# Patient Record
Sex: MALE | Race: WHITE | NOT HISPANIC OR LATINO | Employment: FULL TIME | RURAL
[De-identification: names, ages, dates, MRNs, and addresses within clinical notes are randomized per-mention and may not be internally consistent; named-entity substitution may affect disease eponyms.]

---

## 2023-01-06 ENCOUNTER — OFFICE VISIT (OUTPATIENT)
Dept: FAMILY MEDICINE CLINIC | Facility: CLINIC | Age: 33
End: 2023-01-06
Payer: COMMERCIAL

## 2023-01-06 ENCOUNTER — TELEPHONE (OUTPATIENT)
Dept: FAMILY MEDICINE CLINIC | Facility: CLINIC | Age: 33
End: 2023-01-06

## 2023-01-06 VITALS
BODY MASS INDEX: 31.64 KG/M2 | RESPIRATION RATE: 16 BRPM | SYSTOLIC BLOOD PRESSURE: 132 MMHG | DIASTOLIC BLOOD PRESSURE: 80 MMHG | HEIGHT: 72 IN | HEART RATE: 62 BPM | OXYGEN SATURATION: 99 % | TEMPERATURE: 98.4 F | WEIGHT: 233.6 LBS

## 2023-01-06 DIAGNOSIS — M25.562 ACUTE PAIN OF LEFT KNEE: Primary | ICD-10-CM

## 2023-01-06 PROCEDURE — 99213 OFFICE O/P EST LOW 20 MIN: CPT | Performed by: NURSE PRACTITIONER

## 2023-01-06 RX ORDER — PREDNISONE 10 MG/1
TABLET ORAL
Qty: 21 TABLET | Refills: 0 | Status: SHIPPED | OUTPATIENT
Start: 2023-01-06 | End: 2023-01-27

## 2023-01-06 NOTE — TELEPHONE ENCOUNTER
Caller: [facility] [address] [place] [address] MONY [facility] - [phone]  - [phone]    Relationship: Pharmacy    Best call back number: [phone]    What is the best time to reach you: ANY    Who are you requesting to speak with (clinical staff, provider,  specific staff member): CLINICAL    What was the call regarding: THE PHARMACY NEEDS A CALL BACK REGARDING DOSAGE AND FILL VERIFICATION.      Do you require a callback: YES, PLEASE.

## 2023-01-07 NOTE — ASSESSMENT & PLAN NOTE
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 He felt a shooting pain in his left knee at the time. It has continued into today. He described the feeling as \"a charley horse\", that is positional in nature. He notes it is worse after prolonged sitting when he tried to stand again. He denies any shooting, burning or tingling, no numbness. When he points out the location it is located more lateral posterior area of the knee. Physical exam negative for any signs of ACL or MCL injury    -CT imaging to rule out structural damage  -Rest for the next five days, keep leg elevated, utilize cool compress  -Tapering prednisone as written.

## 2023-01-13 ENCOUNTER — TELEPHONE (OUTPATIENT)
Dept: FAMILY MEDICINE CLINIC | Facility: CLINIC | Age: 33
End: 2023-01-13
Payer: COMMERCIAL

## 2023-01-13 DIAGNOSIS — M25.562 ACUTE PAIN OF LEFT KNEE: Primary | ICD-10-CM

## 2023-01-13 DIAGNOSIS — M25.562 ACUTE PAIN OF LEFT KNEE: ICD-10-CM

## 2023-01-17 ENCOUNTER — TELEPHONE (OUTPATIENT)
Dept: FAMILY MEDICINE CLINIC | Facility: CLINIC | Age: 33
End: 2023-01-17
Payer: COMMERCIAL

## 2023-01-17 ENCOUNTER — TELEPHONE (OUTPATIENT)
Dept: FAMILY MEDICINE CLINIC | Facility: CLINIC | Age: 33
End: 2023-01-17

## 2023-01-17 DIAGNOSIS — M25.562 ACUTE PAIN OF LEFT KNEE: Primary | ICD-10-CM

## 2023-01-17 NOTE — TELEPHONE ENCOUNTER
Called and spoke with patient and advised him of CT results and he verbalized understanding. He states that his knee hurts in the morning and when he stands for long periods of time.I advised him if no better to call and make an appointment for evaluation

## 2023-01-17 NOTE — TELEPHONE ENCOUNTER
----- Message from ROSETTA Medrano sent at 1/17/2023  8:31 AM EST -----  Please let bandar know I reviewed his CT. Looks like there is no significant injury to his knee, just some bursitis of his knee cap which is inflammation of the fluid filled sac in the joint. The treatment for this is the steroids and instructions I gave him during his visit. Ask him if he is noticing improvement

## 2023-01-25 DIAGNOSIS — M25.562 ACUTE PAIN OF LEFT KNEE: ICD-10-CM

## 2023-01-26 ENCOUNTER — TELEPHONE (OUTPATIENT)
Dept: FAMILY MEDICINE CLINIC | Facility: CLINIC | Age: 33
End: 2023-01-26
Payer: COMMERCIAL

## 2023-01-26 NOTE — TELEPHONE ENCOUNTER
----- Message from ROSETTA Medrano sent at 1/25/2023  7:39 PM EST -----  Please let Hammad know I have reviewed his MRI and it was normal. No damage noted

## 2023-01-27 ENCOUNTER — OFFICE VISIT (OUTPATIENT)
Dept: FAMILY MEDICINE CLINIC | Facility: CLINIC | Age: 33
End: 2023-01-27
Payer: COMMERCIAL

## 2023-01-27 VITALS
WEIGHT: 236.2 LBS | BODY MASS INDEX: 31.99 KG/M2 | HEART RATE: 85 BPM | DIASTOLIC BLOOD PRESSURE: 74 MMHG | RESPIRATION RATE: 18 BRPM | TEMPERATURE: 97.5 F | OXYGEN SATURATION: 98 % | HEIGHT: 72 IN | SYSTOLIC BLOOD PRESSURE: 126 MMHG

## 2023-01-27 DIAGNOSIS — M54.16 LEFT LUMBAR RADICULOPATHY: Primary | ICD-10-CM

## 2023-01-27 PROCEDURE — 99214 OFFICE O/P EST MOD 30 MIN: CPT | Performed by: NURSE PRACTITIONER

## 2023-01-27 NOTE — ASSESSMENT & PLAN NOTE
Worsening left lower extremity pain when standing or walking despite treatment with Medrol Dosepak, rest and cold compress therapy.  The pain initially was only reported in his knee, but now radiates up his thigh to mid calf range.  CT positive for infrapatellar bursitis, MRI negative.  He has had similar presentation in the past with left lower extremity pain, absence of back pain, but was ultimately diagnosed with radiculopathy from disc protrusion and subsequently underwent L5-S1 discectomy.  This was in 2014    -Given his lack of response to current therapies and past history of disc issues, would benefit from reevaluation by Dr. Cali with neurosurgery at this time.  Advised to refrain from work until further evaluation with neurosurgery.

## 2023-01-27 NOTE — PROGRESS NOTES
"    Office Note     Name: Hammad Pressley    : 1990     MRN: [id number]     Chief Complaint  right leg pain    Subjective     History of Present Illness:  Hammad Pressley is a 32 y.o. male who presents today for follow-up on left lower extremity pain.  He initially presented for evaluation of a work-related injury.  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  He described the feeling as \"charley horse horse\", that was positional in nature.  He noted the pain was worse after prolonged sitting and when he tried to stand.  He was giving Medrol Dosepak and instructed to rest for the next 5 days, keeping legs elevated and utilizing cool compresses. Imaging was ordered, CT without contrast as that is what was approved by Workmen's [employer]. CT showed infrapatellar bursitis.  So MRI was pursued.  MRI of the left lower extremity was negative.  He presents today with no relief from prednisone therapy, rest or cold compress, he notes his discomfort is actually worsening.  In review of records he experienced the same presentation in  after a work-related injury.  [place] the lumbar spine at that time revealed left S1 radiculopathy secondary to disc protrusion.  Subsequently he underwent L5-S1 discectomy.  Given negative imaging of left extremity and lack of response to steroid therapy, at this time it would be pertinent for referral to neurosurgery for evaluation.    Review of Systems   Constitutional: Positive for activity change. Negative for fatigue.   Respiratory: Negative for cough, choking, chest tightness, shortness of breath and wheezing.    Cardiovascular: Negative for chest pain, palpitations and leg swelling.   Gastrointestinal: Negative for abdominal pain, constipation, diarrhea, nausea and vomiting.   Genitourinary: Negative for decreased urine volume, difficulty urinating, flank pain and hematuria. " "  Musculoskeletal: Positive for arthralgias and myalgias. Negative for back pain.   Neurological: Negative for weakness, light-headedness, numbness, headache and confusion.       Objective     No past medical history on file.  Past Surgical History:   Procedure Laterality Date   • BACK SURGERY     • SHOULDER SURGERY       No family history on file.    Vital Signs  /74 (BP Location: Left arm, Patient Position: Sitting, Cuff Size: Adult)   Pulse 85   Temp 97.5 °F (36.4 °C) (Temporal)   Resp 18   Ht 182.9 cm (72\")   Wt 107 kg (236 lb 3.2 oz)   SpO2 98%   BMI 32.03 kg/m²   Estimated body mass index is 32.03 kg/m² as calculated from the following:    Height as of this encounter: 182.9 cm (72\").    Weight as of this encounter: 107 kg (236 lb 3.2 oz).    Physical Exam  Vitals reviewed.   Constitutional:       Appearance: Normal appearance.   HENT:      Head: Normocephalic and atraumatic.      Mouth/Throat:      Mouth: Mucous membranes are moist.      Pharynx: Oropharynx is clear.   Eyes:      Conjunctiva/sclera: Conjunctivae normal.   Cardiovascular:      Rate and Rhythm: Normal rate and regular rhythm.      Pulses: Normal pulses.      Heart sounds: Normal heart sounds.   Pulmonary:      Effort: Pulmonary effort is normal.      Breath sounds: Normal breath sounds.   Musculoskeletal:         General: Normal range of motion.      Cervical back: Normal range of motion and neck supple.      Comments: On exam no lumbar tenderness or left lower extremity tenderness noted on palpation.  He reports his discomfort to be only with standing.   Skin:     General: Skin is warm and dry.   Neurological:      Mental Status: He is alert and oriented to person, place, and time.   Psychiatric:         Mood and Affect: Mood normal.         Behavior: Behavior normal.         Thought Content: Thought content normal.         Judgment: Judgment normal.          The following data was reviewed by: ROSETTA Mderano on " 01/27/2023:    Data reviewed: Radiologic studies CT and MRI     POCT Results (if applicable):  No results found for this or any previous visit.         Assessment and Plan     Diagnoses and all orders for this visit:    1. Left lumbar radiculopathy (Primary)  Assessment & Plan:  Worsening left lower extremity pain when standing or walking despite treatment with Medrol Dosepak, rest and cold compress therapy.  The pain initially was only reported in his knee, but now radiates up his thigh to mid calf range.  CT positive for infrapatellar bursitis, MRI negative.  He has had similar presentation in the past with left lower extremity pain, absence of back pain, but was ultimately diagnosed with radiculopathy from disc protrusion and subsequently underwent L5-S1 discectomy.  This was in 2014    -Given his lack of response to current therapies and past history of disc issues, would benefit from reevaluation by Dr. Cali with neurosurgery at this time.  Advised to refrain from work until further evaluation with neurosurgery.                Orders:  -     Ambulatory Referral to Neurosurgery        I spent 35 minutes caring for Hammad on this date of service. This time includes time spent by me in the following activities:preparing for the visit, reviewing tests, performing a medically appropriate examination and/or evaluation , counseling and educating the patient/family/caregiver and referring and communicating with other health care professionals       Follow Up  No follow-ups on file.    Aylin Monique, APRN

## 2023-02-22 ENCOUNTER — OFFICE VISIT (OUTPATIENT)
Dept: NEUROSURGERY | Facility: CLINIC | Age: 33
End: 2023-02-22

## 2023-02-22 VITALS
TEMPERATURE: 98 F | HEIGHT: 72 IN | BODY MASS INDEX: 32.78 KG/M2 | SYSTOLIC BLOOD PRESSURE: 140 MMHG | WEIGHT: 242 LBS | DIASTOLIC BLOOD PRESSURE: 80 MMHG

## 2023-02-22 DIAGNOSIS — M54.16 LEFT LUMBAR RADICULOPATHY: ICD-10-CM

## 2023-02-22 DIAGNOSIS — Z98.890 S/P DISCECTOMY: ICD-10-CM

## 2023-02-22 DIAGNOSIS — M25.562 ACUTE PAIN OF LEFT KNEE: Primary | ICD-10-CM

## 2023-02-22 PROCEDURE — 99203 OFFICE O/P NEW LOW 30 MIN: CPT | Performed by: PHYSICIAN ASSISTANT

## 2023-02-22 RX ORDER — METHOCARBAMOL 500 MG/1
500 TABLET, FILM COATED ORAL 3 TIMES DAILY PRN
Qty: 45 TABLET | Refills: 1 | Status: SHIPPED | OUTPATIENT
Start: 2023-02-22

## 2023-02-22 NOTE — PROGRESS NOTES
"Subjective     Chief Complaint: back pain and left leg pain    Patient ID: Hammad Pressley is a 32 y.o. 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  He has a remote history of L5-S1 discectomy on the left in 2014 with Dr. Cali after developing left leg pain.  He did well postoperatively with no issues.    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  They each pulled away from him in different directions and he twisted to the left.  He had immediate, severe posterior knee pain.  He had a CT and then an MRI of his knee which were negative.  He was also given a steroid pack.  Over the last few weeks, his pain has begun to radiate into his buttock, posterior thigh and back of the knee to at his upper calf on the left.  When it changed character, he felt that it was much more like his previous bout of lumbar radiculopathy and spoke with his primary care who referred him to neurosurgery.  He does not have any imaging of his lumbar spine.    He states that his pain is a sharp burning pain.  It is present worse with standing.  He does have pain with walking longer distances but generally walking is \"not too bad\".  Sitting or lying down gives him good relief of his discomfort.  He denies any groin or testicular pain numbness or tingling.  He has not had any bowel or bladder dysfunction or saddle anesthesia    The following portions of the patient's history were reviewed and updated as appropriate: allergies, current medications, past family history, past medical history, past social history, past surgical history and problem list.    No past medical history on file.     Past Surgical History:   Procedure Laterality Date   • BACK SURGERY  L5-S1 discectomy w Dr. Cali Left  2014   • SHOULDER SURGERY           Family history: No family history on file.    Social history:   Social History     Socioeconomic History   • " "Marital status:    Tobacco Use   • Smoking status: Never   • Smokeless tobacco: Never   Vaping Use   • Vaping Use: Never used   Substance and Sexual Activity   • Alcohol use: Yes     Alcohol/week: 1.0 standard drink     Types: 1 Glasses of wine per week   • Drug use: Never       Review of Systems   Left leg pain    Objective   Blood pressure 140/80, temperature 98 °F (36.7 °C), temperature source Infrared, height 182.9 cm (72.01\"), weight 110 kg (242 lb).  Body mass index is 32.81 kg/m².    Physical Exam    CONSTITUTIONAL:   - Patient is well-nourished  - Pleasant and appears stated age.  PSYCHIATRIC:  - Normal mood and affect  - Behavior is normal.  HENT:   Head: Normocephalic and Atraumatic.   Eyes:     - Pupils are equal, round, and reactive to light.     - EOM are normal.   CV:   - Regular rate and rhythm on palpable radial pulse   PULMONARY:   - Speaking in full sentences, breathing non labored  - No wheezing   SKIN:  - Clean, dry and intact   MUSCULOSKELETAL:  - Neck/Back tenderness to palpation is not observed.   - Strength is 5/5 and intact in the lower extremities to direct testing.  - Muscle tone is normal throughout.  - Station and gait are normal.  - ROM in neck/back is normal.  - Straight leg raise is negative   NEUROLOGICAL:  - A&Ox3  - Attention span, language function, and cognition are intact.  - Sensation is intact to light touch testing throughout.  - Deep tendon reflexes are 2+ and symmetrical.    - Coordination is intact.     Patient's Body mass index is 32.81 kg/m². indicating that he is obese     Assessment & Plan   Mr. Pressley has a new history of left leg pain without significant back pain following a work-related injury on 1/5/2023.  He has had a lumbar discectomy in the past and states that this pain is similar to the pain that he had prior to that surgery.    He has not had any physical therapy or treatment with NSAIDs/muscle relaxants.  We will start with this.  He has done a " steroid pack already.  We will also start him on some physical therapy for the next couple of weeks and keep him off work while he begins that.  If his pain improves, he may be able to return to work as early as March 13.  He will call the week of March 10 with an update.  If his pain is worse or not improving, we will keep him off work and obtain an MRI of the lumbar spine with and without contrast for review at a follow-up visit    Diagnoses and all orders for this visit:    1. Acute pain of left knee (Primary)  -     Ambulatory Referral to Physical Therapy Evaluate and treat    2. Left lumbar radiculopathy  -     Ambulatory Referral to Physical Therapy Evaluate and treat    3. S/P discectomy  -     Ambulatory Referral to Physical Therapy Evaluate and treat    Other orders  -     diclofenac (VOLTAREN) 50 MG EC tablet; Take 1 tablet by mouth Daily.  Dispense: 60 tablet; Refill: 0  -     methocarbamol (Robaxin) 500 MG tablet; Take 1 tablet by mouth 3 (Three) Times a Day As Needed for Muscle Spasms.  Dispense: 45 tablet; Refill: 1        Return in about 15 days (around 3/9/2023).  Olamide Henry PA-C          This document signed by Olamide Henry PA-C  February 22, 2023 11:12 EST

## 2023-03-06 ENCOUNTER — PATIENT MESSAGE (OUTPATIENT)
Dept: NEUROSURGERY | Facility: CLINIC | Age: 33
End: 2023-03-06
Payer: COMMERCIAL

## 2023-03-06 DIAGNOSIS — M54.16 LEFT LUMBAR RADICULOPATHY: Primary | ICD-10-CM

## 2023-03-07 NOTE — TELEPHONE ENCOUNTER
From: Hammad Pressley  To: Olamide Henry  Sent: 3/6/2023 2:06 PM EST  Subject: Leg Pain/Update    Still experiencing pain down leg, especially after moving around and then standing for period of time

## 2023-03-13 ENCOUNTER — TELEPHONE (OUTPATIENT)
Dept: NEUROSURGERY | Facility: CLINIC | Age: 33
End: 2023-03-13
Payer: COMMERCIAL

## 2023-03-13 NOTE — TELEPHONE ENCOUNTER
"Per Jesus:  \" If you all have an inbasket for this patient to schedule an MRI just dismiss, I had a call from the claims officer and she wanted me to fax her the order and she'll schedule it\"  "

## 2023-03-13 NOTE — TELEPHONE ENCOUNTER
Caller: Hammad Pressley    Relationship: Self    Best call back number: [phone]    What is the best time to reach you: ANYTIME    Who are you requesting to speak with (clinical staff, provider,  specific staff member):CLINICAL STAFF    Do you know the name of the person who called:NA    What was the call regarding:PT CALLED TO CHECK STATUS OF MRI ORDER-PT STATES THAT THIS NEEDS TO BE SENT TO HIS WC ADJ. TO HAVE THEM APPROVE AND SCHEDULE HIS MRI-PT IS ALSO ASKING ABOUT A NOTE TO EXTEND TIME OFF DUE TO HIM NOT FEELING ANY BETTER AND STILL CONTINUING WITH PHYSICAL THERAPY PT WOULD LIKE A CALL BACK THANK YOU        Do you require a callback: YES

## 2023-03-15 ENCOUNTER — TELEPHONE (OUTPATIENT)
Dept: NEUROSURGERY | Facility: CLINIC | Age: 33
End: 2023-03-15
Payer: COMMERCIAL

## 2023-03-15 NOTE — TELEPHONE ENCOUNTER
Viraj Patient in 2014.   Last OV: Office Visit with Olamide Henry PA-C (02/22/2023)      Please see [employer] message below. Patient is requesting an updated RTW note. It looks like we are working on scheduling his MRI. Okay to provide work note for a couple more weeks?

## 2023-03-15 NOTE — TELEPHONE ENCOUNTER
----- Message from Brianne Villegas MA sent at 3/15/2023  9:40 AM EDT -----  Regarding: FW: Update  Contact: [phone]  Patient is requesting an updated RTW note. It looks like we are working on scheduling his MRI. Okay to provide work note for a couple more weeks?  ----- Message -----  From: Hammad Pressley  Sent: 3/14/2023   3:21 PM EDT  To: [facility] Clinical Pool  Subject: Update                                           My late note from  had me returning to work yesterday the 13th, since moving forward with a MRI I need an updated note for my work

## 2023-03-20 ENCOUNTER — PATIENT MESSAGE (OUTPATIENT)
Dept: NEUROSURGERY | Facility: CLINIC | Age: 33
End: 2023-03-20
Payer: COMMERCIAL

## 2023-03-20 ENCOUNTER — TELEPHONE (OUTPATIENT)
Dept: NEUROSURGERY | Facility: CLINIC | Age: 33
End: 2023-03-20
Payer: COMMERCIAL

## 2023-03-20 NOTE — TELEPHONE ENCOUNTER
"Patient sent a separate [employer]pp[employer] message stating, \"Since I have a MRI scheduled for 3/31/23 can I go on and schedule my follow up appointment.\"    This is scheduled with [facility] [place], due to Workers Comp.     Please schedule the patient an appointment with a PA after 03/31/2023, and provide an off work note until the follow up appointment.   "

## 2023-03-20 NOTE — TELEPHONE ENCOUNTER
From: Hammad Pressley  To: Olamide Henry  Sent: 3/20/2023 3:01 PM EDT  Subject: MRI    Since I have a MRI scheduled for 3/31/23 can I go on and schedule my follow up appointment

## 2023-03-20 NOTE — TELEPHONE ENCOUNTER
Provider:  Mildred  Surgery/Procedure:  meredith  Surgery/Procedure Date:  na  Last visit:   2-22-23  Next visit: 4-12-23     Reason for call: Michelle called back and said that Workers [employer] would not take the last office note for their file so that the patient can continue with PT.  She has faxed over their PT office note for Mildred hendricks. I have called her and told her that she will not be seeing patients till April.  I will keep the paper and when I see her I will fax this over to Michelle at PT.  She is going to let Workers [employer] know, she was thankful for the call back.                                                                Provider:  Mildred  Surgery/Procedure:  meredith  Surgery/Procedure Date:  na  Last visit:   2-  Next visit: NA     Reason for call: Michelle from PT called and said they needed a new order for workers comp.  I have tried to call her back to get more information, I left a VM to return the call.    Michelle called back and said that she needs a new order for PT for Workmen's Comp to continue to pay for more visits.  I told her that the referral should be good for 6 months to a year and she agreed but  Workmen's  Comp does not.  I am sending over the office visit from Mildred and we are hoping they will accept this.  She will call back if anything else is needed.  She was thankful for the call back.    I have faxed this over this morning.

## 2023-04-12 ENCOUNTER — OFFICE VISIT (OUTPATIENT)
Dept: NEUROSURGERY | Facility: CLINIC | Age: 33
End: 2023-04-12
Payer: OTHER MISCELLANEOUS

## 2023-04-12 VITALS
HEIGHT: 73 IN | WEIGHT: 246 LBS | DIASTOLIC BLOOD PRESSURE: 90 MMHG | SYSTOLIC BLOOD PRESSURE: 120 MMHG | BODY MASS INDEX: 32.6 KG/M2 | TEMPERATURE: 97.3 F

## 2023-04-12 DIAGNOSIS — M51.36 DISC DEGENERATION, LUMBAR: ICD-10-CM

## 2023-04-12 DIAGNOSIS — M54.16 LEFT LUMBAR RADICULOPATHY: Primary | ICD-10-CM

## 2023-04-12 DIAGNOSIS — Z98.890 S/P DISCECTOMY: ICD-10-CM

## 2023-04-12 PROCEDURE — 99214 OFFICE O/P EST MOD 30 MIN: CPT | Performed by: PHYSICIAN ASSISTANT

## 2023-04-12 NOTE — PROGRESS NOTES
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 Cali's clinic for undergoing L5-S1 discectomy on the left in .  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  He had a CT and then an MRI of his knee which were negative.  He was also treated with a steroid pack.  Over a few weeks, the pain began to radiate from his buttock, posterior thigh and back of the knee to his upper calf on the left.  He has been treated with formal therapy.  Today he reports his pain has improved significantly.  Every now and then after standing for a prolonged period he will experience some numbness and discomfort in the lateral left knee.  He has been off work but feels ready to go back      History reviewed. No pertinent past medical history.      Current Outpatient Medications:   •  diclofenac (VOLTAREN) 50 MG EC tablet, Take 1 tablet by mouth Daily., Disp: 60 tablet, Rfl: 0  •  methocarbamol (Robaxin) 500 MG tablet, Take 1 tablet by mouth 3 (Three) Times a Day As Needed for Muscle Spasms., Disp: 45 tablet, Rfl: 1    Past Surgical History:   Procedure Laterality Date   • BACK SURGERY     • SHOULDER SURGERY         Social History     Socioeconomic History   • Marital status:    Tobacco Use   • Smoking status: Never   • Smokeless tobacco: Never   Vaping Use   • Vaping Use: Never used   Substance and Sexual Activity   • Alcohol use: Yes     Alcohol/week: 1.0 standard drink     Types: 1 Cans of beer per week   • Drug use: Never   • Sexual activity: Defer         Review of Systems   Constitutional: Negative for activity change, appetite change, chills, diaphoresis, fatigue, fever and unexpected weight change.   HENT: Negative for congestion, dental problem, drooling,  "ear discharge, ear pain, facial swelling, hearing loss, mouth sores, nosebleeds, postnasal drip, rhinorrhea, sinus pressure, sinus pain, sneezing, sore throat, tinnitus, trouble swallowing and voice change.    Eyes: Negative for photophobia, pain, discharge, redness, itching and visual disturbance.   Respiratory: Negative for apnea, cough, choking, chest tightness, shortness of breath, wheezing and stridor.    Cardiovascular: Negative for chest pain, palpitations and leg swelling.   Gastrointestinal: Negative for abdominal distention, abdominal pain, anal bleeding, blood in stool, constipation, diarrhea, nausea, rectal pain and vomiting.   Endocrine: Negative for cold intolerance, heat intolerance, polydipsia, polyphagia and polyuria.   Genitourinary: Negative for decreased urine volume, difficulty urinating, dysuria, enuresis, flank pain, frequency, genital sores, hematuria and urgency.   Musculoskeletal: Positive for back pain. Negative for arthralgias, gait problem, joint swelling, myalgias, neck pain and neck stiffness.   Skin: Negative for color change, pallor, rash and wound.   Allergic/Immunologic: Negative for environmental allergies, food allergies and immunocompromised state.   Neurological: Negative for dizziness, tremors, seizures, syncope, facial asymmetry, speech difficulty, weakness, light-headedness, numbness and headaches.   Hematological: Negative for adenopathy. Does not bruise/bleed easily.   Psychiatric/Behavioral: Negative for agitation, behavioral problems, confusion, decreased concentration, dysphoric mood, hallucinations, self-injury, sleep disturbance and suicidal ideas. The patient is not nervous/anxious and is not hyperactive.        Objective   Vital Signs: Blood pressure 120/90, temperature 97.3 °F (36.3 °C), temperature source Infrared, height 185.4 cm (73\"), weight 112 kg (246 lb).  Physical Exam  Vitals and nursing note reviewed.   Constitutional:       General: He is not in acute " distress.     Appearance: He is well-developed.   Eyes:      Pupils: Pupils are equal, round, and reactive to light.   Cardiovascular:      Heart sounds: Normal heart sounds.   Pulmonary:      Breath sounds: Normal breath sounds.   Psychiatric:         Behavior: Behavior normal.         Thought Content: Thought content normal.     Musculoskeletal:     Strength is intact in upper and lower extremities to direct testing.     Station and gait are normal.     Straight leg raising is negative.   Neurologic:     Muscle tone is normal throughout.     Sensation is intact to light touch throughout.     Patient is oriented to person, place, and time.         Independent review of radiographic imaging: MRI of the lumbar spine demonstrates focal degenerative disc at L5-S1.  There is postoperative changes from previous discectomy at L5-S1 on the left.  There appears to be at least some recurrent left paracentral disc protrusion as well as scar.    Assessment & Plan   Diagnosis: Recurrent disc herniation with radiculopathy, improved.     Medical Decision Making: Fortunately patient is doing much better. Leg pain is gone. He feels ready to return to work. I am going to send him back for half days for two weeks then he can return to his regular schedule.  I reviewed lifting and body mechanics. Follow up with me in 8 weeks to his progress. Call the office in the interim with any questions or concerns.     Diagnoses and all orders for this visit:    1. Left lumbar radiculopathy (Primary)    2. S/P discectomy    3. Disc degeneration, lumbar                            Rossy Dunne PA-C  Patient Care Team:  Aylin Monique APRN as PCP - General (Family Medicine)

## 2023-05-16 ENCOUNTER — OFFICE VISIT (OUTPATIENT)
Dept: FAMILY MEDICINE CLINIC | Facility: CLINIC | Age: 33
End: 2023-05-16
Payer: OTHER MISCELLANEOUS

## 2023-05-16 VITALS
WEIGHT: 238.4 LBS | TEMPERATURE: 97.2 F | HEIGHT: 73 IN | BODY MASS INDEX: 31.6 KG/M2 | RESPIRATION RATE: 18 BRPM | SYSTOLIC BLOOD PRESSURE: 124 MMHG | OXYGEN SATURATION: 94 % | DIASTOLIC BLOOD PRESSURE: 82 MMHG | HEART RATE: 114 BPM

## 2023-05-16 DIAGNOSIS — M54.16 LEFT LUMBAR RADICULOPATHY: Primary | ICD-10-CM

## 2023-05-16 RX ORDER — KETOROLAC TROMETHAMINE 30 MG/ML
60 INJECTION, SOLUTION INTRAMUSCULAR; INTRAVENOUS ONCE
Status: COMPLETED | OUTPATIENT
Start: 2023-05-16 | End: 2023-05-16

## 2023-05-16 RX ADMIN — KETOROLAC TROMETHAMINE 60 MG: 30 INJECTION, SOLUTION INTRAMUSCULAR; INTRAVENOUS at 15:38

## 2023-05-16 NOTE — ASSESSMENT & PLAN NOTE
Been back to work five weeks. 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 Some radiating through the buttocks. No numbness or tingling. Took some naproxen without benefit.  He showed initial benefit with physical therapy but appears to have suffered a reinjury yesterday at work.  Naproxen, heat and rest are providing no benefit.  -We will contact neurosurgery in hopes of getting him an appointment sooner, will request to be put on cancellation list.  -We will administer 60 mg IM Toradol in office today

## 2023-05-17 ENCOUNTER — TELEPHONE (OUTPATIENT)
Dept: NEUROSURGERY | Facility: CLINIC | Age: 33
End: 2023-05-17
Payer: COMMERCIAL

## 2023-05-17 NOTE — TELEPHONE ENCOUNTER
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     PLEASE CALL PT TO ADVISE/DISCUSS/SCHEDULE    THANK YOU,

## 2023-05-18 NOTE — PROGRESS NOTES
Answers for HPI/ROS submitted by the patient on 2023  What is the primary reason for your visit?: Back Pain  Chronicity: recurrent  Onset: yesterday  Frequency: constantly  Progression since onset: gradually worsening  Pain location: gluteal, lumbar spine, sacro-iliac  Pain quality: aching, shooting  Radiates to: does not radiate  Pain - numeric: 10/10  Pain is: the same all the time  Aggravated by: bending, coughing, position  Stiffness is present: all day  bowel incontinence: No  headaches: No  leg pain: No  paresis: No  paresthesias: No  pelvic pain: No  perianal numbness: No  tingling: No  Risk factors: recent trauma        Office Note     Name: Hammad Pressley    : 1990     MRN: [id number]     Chief Complaint  Back Pain    Subjective     History of Present Illness:  Hammad Pressley is a 32 y.o. male who presents today for ongoing back pain.  He underwent L5-S1 discectomy on the left in .  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  He had a CT and an MRI of his knee which were negative.  Also treated with a steroid pack.  Over the next couple of weeks the pain began to radiate from his buttock, posterior thigh and back of the knee to his upper calf on the left.  He has been treated with physical therapy.  During follow-up at neurosurgery on  he reported his pain had improved significantly.  Now and then after standing for a prolonged period of time he would experience some numbness and discomfort in the lateral left knee.  He had been off work but felt ready to go back.  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  His pain has been unresponsive to over-the-counter NSAIDs.  He has follow-up with neurosurgery in a few weeks but is going to try to get in sooner.  Denies any loss of  "bowel or bladder.  No tenderness on palpation, pain is more positional in nature.  He is finding it hard to stand upright, unable to perform straight leg raise during assessment today.  No further complaints or concerns    Review of Systems   Constitutional: Negative for fever and unexpected weight loss.   Cardiovascular: Negative for chest pain.   Gastrointestinal: Negative for abdominal pain.   Genitourinary: Negative for dysuria and urinary incontinence.   Musculoskeletal: Positive for back pain.   Neurological: Positive for weakness. Negative for numbness.       Objective     No past medical history on file.  Past Surgical History:   Procedure Laterality Date   • BACK SURGERY     • SHOULDER SURGERY       No family history on file.    Vital Signs  /82 (BP Location: Right arm, Patient Position: Standing, Cuff Size: Adult)   Pulse 114   Temp 97.2 °F (36.2 °C) (Temporal)   Resp 18   Ht 185.4 cm (73\")   Wt 108 kg (238 lb 6.4 oz)   SpO2 94%   BMI 31.45 kg/m²   Estimated body mass index is 31.45 kg/m² as calculated from the following:    Height as of this encounter: 185.4 cm (73\").    Weight as of this encounter: 108 kg (238 lb 6.4 oz).    Physical Exam  Constitutional:       Appearance: Normal appearance.   Cardiovascular:      Rate and Rhythm: Normal rate and regular rhythm.      Pulses: Normal pulses.      Heart sounds: Normal heart sounds.   Pulmonary:      Effort: Pulmonary effort is normal.      Breath sounds: Normal breath sounds.   Musculoskeletal:      Cervical back: Normal range of motion and neck supple.      Lumbar back: No tenderness or bony tenderness.   Skin:     General: Skin is warm and dry.   Neurological:      Mental Status: He is alert and oriented to person, place, and time.          Data reviewed: Consultant notes neurosurgery     POCT Results (if applicable):  No results found for this or any previous visit.         Assessment and Plan     Diagnoses and all orders for this " visit:    1. Left lumbar radiculopathy (Primary)  Assessment & Plan:  Been back to work five weeks. 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 Some radiating through the buttocks. No numbness or tingling. Took some naproxen without benefit.  He showed initial benefit with physical therapy but appears to have suffered a reinjury yesterday at work.  Naproxen, heat and rest are providing no benefit.  -We will contact neurosurgery in hopes of getting him an appointment sooner, will request to be put on cancellation list.  -We will administer 60 mg IM Toradol in office today    Orders:  -     ketorolac (TORADOL) injection 60 mg    BMI is >= 30 and <35. (Class 1 Obesity). The following options were offered after discussion;: nutrition counseling/recommendations        Follow Up  No follow-ups on file.    Aylin Monique, APRN

## 2023-05-18 NOTE — TELEPHONE ENCOUNTER
Please refer to MyChart message from Aylin Monique, ROSETTA. Pt has been put in cancellation list for next available appointment.

## 2023-06-05 ENCOUNTER — OFFICE VISIT (OUTPATIENT)
Dept: FAMILY MEDICINE CLINIC | Facility: CLINIC | Age: 33
End: 2023-06-05
Payer: COMMERCIAL

## 2023-06-05 VITALS
DIASTOLIC BLOOD PRESSURE: 74 MMHG | HEART RATE: 74 BPM | WEIGHT: 234.2 LBS | HEIGHT: 73 IN | OXYGEN SATURATION: 98 % | SYSTOLIC BLOOD PRESSURE: 122 MMHG | RESPIRATION RATE: 18 BRPM | TEMPERATURE: 98.2 F | BODY MASS INDEX: 31.04 KG/M2

## 2023-06-05 DIAGNOSIS — L74.510 HYPERHIDROSIS OF AXILLA: ICD-10-CM

## 2023-06-05 DIAGNOSIS — F41.9 ANXIETY: ICD-10-CM

## 2023-06-05 DIAGNOSIS — R17 ELEVATED BILIRUBIN: ICD-10-CM

## 2023-06-05 DIAGNOSIS — D72.829 LEUKOCYTOSIS, UNSPECIFIED TYPE: Primary | ICD-10-CM

## 2023-06-05 RX ORDER — BUPROPION HYDROCHLORIDE 150 MG/1
150 TABLET ORAL DAILY
Qty: 30 TABLET | Refills: 0 | Status: SHIPPED | OUTPATIENT
Start: 2023-06-05 | End: 2023-07-05

## 2023-06-05 RX ORDER — GLYCOPYRROLATE 1 MG/1
1 TABLET ORAL 2 TIMES DAILY
Qty: 60 TABLET | Refills: 0 | Status: SHIPPED | OUTPATIENT
Start: 2023-06-05 | End: 2023-07-05

## 2023-06-05 NOTE — PROGRESS NOTES
Venipuncture Blood Specimen Collection  Venipuncture performed in right arm by Erika Willis MA with good hemostasis. Patient tolerated the procedure well without complications.   06/05/23   Erika Willis MA

## 2023-06-05 NOTE — PROGRESS NOTES
Office Note     Name: Hammad Pressley    : 1990     MRN: [id number]     Chief Complaint  Follow-up (From ER)    Subjective     History of Present Illness:  Hammad Pressley is a 32 y.o. male who presents today for follow up on ED visit at [facility] approximately 4 weeks ago.  He reports he began to have significant sinus pressure and feeling generally unwell.  He then developed a sore throat, fever of 102 and developed jaundice.  He reported to the emergency department for further evaluation where he tells me his liver enzymes were elevated, particularly a high bilirubin and elevated white count was found.  They did extensive diagnostic work-up with both imaging and blood work.  All imaging was negative at that time and no etiology of his elevated white count or fever were identified.  He was referred to Dr. Rucker for further evaluation.  Repeat labs showed normalization of bilirubin and all other liver function blood work.  His white count is also back to normal.  Pending liver ultrasound.  We have requested records from [facility] but have not received them at this time.  He continues to have low back pain, being followed by neurosurgery.  His wife is accompanying him today in feels he would benefit from medication to help with some anxiety issues with new onset health concerns.  Has been off work for a number of weeks due to back issues and has been agitated since having to be so sedentary.  Patient is in agreement he may benefit from temporary medication as well.  No further complaints or concerns today    Review of Systems   Constitutional:  Negative for activity change, appetite change, fatigue and fever.   HENT:  Negative for congestion, sinus pressure and sore throat.    Respiratory:  Negative for cough and shortness of breath.    Cardiovascular:  Negative for chest pain, palpitations and leg swelling.   Gastrointestinal:  Negative for abdominal distention, abdominal pain, constipation,  "diarrhea, nausea, vomiting and indigestion.   Genitourinary:  Negative for difficulty urinating and hematuria.   Musculoskeletal:  Positive for arthralgias and back pain.   Skin:  Negative for rash and bruise.   Neurological:  Negative for dizziness, light-headedness and headache.   Psychiatric/Behavioral:  Positive for agitation and stress. Negative for depressed mood.      Objective     History reviewed. No pertinent past medical history.  Past Surgical History:   Procedure Laterality Date    BACK SURGERY      SHOULDER SURGERY       History reviewed. No pertinent family history.    Vital Signs  /74 (BP Location: Left arm, Patient Position: Sitting, Cuff Size: Adult)   Pulse 74   Temp 98.2 °F (36.8 °C) (Temporal)   Resp 18   Ht 185.4 cm (73\")   Wt 106 kg (234 lb 3.2 oz)   SpO2 98%   BMI 30.90 kg/m²   Estimated body mass index is 30.9 kg/m² as calculated from the following:    Height as of this encounter: 185.4 cm (73\").    Weight as of this encounter: 106 kg (234 lb 3.2 oz).    Physical Exam  Vitals reviewed.   Constitutional:       Appearance: Normal appearance.   HENT:      Head: Normocephalic and atraumatic.      Nose: Nose normal.      Mouth/Throat:      Pharynx: Oropharynx is clear.   Eyes:      Conjunctiva/sclera: Conjunctivae normal.      Pupils: Pupils are equal, round, and reactive to light.   Cardiovascular:      Rate and Rhythm: Normal rate and regular rhythm.      Pulses: Normal pulses.      Heart sounds: Normal heart sounds.   Pulmonary:      Effort: Pulmonary effort is normal.      Breath sounds: Normal breath sounds.   Abdominal:      General: There is no distension.      Palpations: Abdomen is soft. There is no mass.      Tenderness: There is no abdominal tenderness.   Skin:     General: Skin is warm and dry.   Neurological:      Mental Status: He is alert and oriented to person, place, and time.        POCT Results (if applicable):  Results for orders placed or performed in visit on " 06/05/23   CBC & Differential    Specimen: Arm, Right; Blood    Blood  Manual Differen   Result Value Ref Range    WBC 4.8 3.4 - 10.8 x10E3/uL    RBC 4.73 4.14 - 5.80 x10E6/uL    Hemoglobin 14.2 13.0 - 17.7 g/dL    Hematocrit 41.9 37.5 - 51.0 %    MCV 89 79 - 97 fL    MCH 30.0 26.6 - 33.0 pg    MCHC 33.9 31.5 - 35.7 g/dL    RDW 13.0 11.6 - 15.4 %    Platelets 273 150 - 450 x10E3/uL    Neutrophil Rel % 53 Not Estab. %    Lymphocyte Rel % 30 Not Estab. %    Monocyte Rel % 13 Not Estab. %    Eosinophil Rel % 2 Not Estab. %    Basophil Rel % 2 Not Estab. %    Neutrophils Absolute 2.6 1.4 - 7.0 x10E3/uL    Lymphocytes Absolute 1.4 0.7 - 3.1 x10E3/uL    Monocytes Absolute 0.6 0.1 - 0.9 x10E3/uL    Eosinophils Absolute 0.1 0.0 - 0.4 x10E3/uL    Basophils Absolute 0.1 0.0 - 0.2 x10E3/uL    Immature Granulocyte Rel % 0 Not Estab. %    Immature Grans Absolute 0.0 0.0 - 0.1 x10E3/uL            Assessment and Plan     Diagnoses and all orders for this visit:    1. Leukocytosis, unspecified type (Primary)  Assessment & Plan:  Patient presented to the emergency department with complaints of sore throat, yellowing of skin and eyes.  Found to have significantly elevated white count at time of presentation.  Repeat white count today back to normal.  No further issues with fever or sore throat    Orders:  -     CBC & Differential; Future  -     CBC & Differential    2. Anxiety  Assessment & Plan:  His wife is accompanying him today in feels he would benefit from medication to help with some anxiety issues with new onset health concerns.  Has been off work for a number of weeks due to back issues and has been agitated since having to be so sedentary.  Patient is in agreement he may benefit from temporary medication as well.   -Initiate Wellbutrin  mg once daily.  Return in 4 weeks to monitor for effect    Orders:  -     buPROPion XL (Wellbutrin XL) 150 MG 24 hr tablet; Take 1 tablet by mouth Daily for 30 days.  Dispense: 30  tablet; Refill: 0    3. Hyperhidrosis of axilla  Assessment & Plan:  Patient with complaints of longstanding excessive sweating of the armpits.  Tried different antiperspirants and deodorants. reports he even has excessive sweating when in a swimming pool.  -Will prescribe glycopyrrolate and monitor for effect    Orders:  -     glycopyrrolate (ROBINUL) 1 MG tablet; Take 1 tablet by mouth 2 (Two) Times a Day for 30 days.  Dispense: 60 tablet; Refill: 0    4. Elevated bilirubin  Assessment & Plan:  Patient had significantly elevated bilirubin on presentation to ED.  He was referred to Dr. Rucker as outpatient and has had initial evaluation.  Repeat blood work was obtained showing liver function and bilirubin back to normal levels.  He has upcoming liver ultrasound for further evaluation            I spent 30 minutes caring for Hammad on this date of service. This time includes time spent by me in the following activities:preparing for the visit, reviewing tests, performing a medically appropriate examination and/or evaluation , counseling and educating the patient/family/caregiver, ordering medications, tests, or procedures, and documenting information in the medical record    Vaccine Coun  Follow Up  Return in about 4 weeks (around 7/3/2023) for Annual physical.    Aylin Monique, APRN

## 2023-06-06 LAB
BASOPHILS # BLD AUTO: 0.1 X10E3/UL (ref 0–0.2)
BASOPHILS NFR BLD AUTO: 2 %
EOSINOPHIL # BLD AUTO: 0.1 X10E3/UL (ref 0–0.4)
EOSINOPHIL NFR BLD AUTO: 2 %
ERYTHROCYTE [DISTWIDTH] IN BLOOD BY AUTOMATED COUNT: 13 % (ref 11.6–15.4)
HCT VFR BLD AUTO: 41.9 % (ref 37.5–51)
HGB BLD-MCNC: 14.2 G/DL (ref 13–17.7)
IMM GRANULOCYTES # BLD AUTO: 0 X10E3/UL (ref 0–0.1)
IMM GRANULOCYTES NFR BLD AUTO: 0 %
LYMPHOCYTES # BLD AUTO: 1.4 X10E3/UL (ref 0.7–3.1)
LYMPHOCYTES NFR BLD AUTO: 30 %
MCH RBC QN AUTO: 30 PG (ref 26.6–33)
MCHC RBC AUTO-ENTMCNC: 33.9 G/DL (ref 31.5–35.7)
MCV RBC AUTO: 89 FL (ref 79–97)
MONOCYTES # BLD AUTO: 0.6 X10E3/UL (ref 0.1–0.9)
MONOCYTES NFR BLD AUTO: 13 %
NEUTROPHILS # BLD AUTO: 2.6 X10E3/UL (ref 1.4–7)
NEUTROPHILS NFR BLD AUTO: 53 %
PLATELET # BLD AUTO: 273 X10E3/UL (ref 150–450)
RBC # BLD AUTO: 4.73 X10E6/UL (ref 4.14–5.8)
WBC # BLD AUTO: 4.8 X10E3/UL (ref 3.4–10.8)

## 2023-06-07 ENCOUNTER — TELEPHONE (OUTPATIENT)
Dept: FAMILY MEDICINE CLINIC | Facility: CLINIC | Age: 33
End: 2023-06-07
Payer: COMMERCIAL

## 2023-06-07 ENCOUNTER — OFFICE VISIT (OUTPATIENT)
Dept: NEUROSURGERY | Facility: CLINIC | Age: 33
End: 2023-06-07
Payer: OTHER MISCELLANEOUS

## 2023-06-07 VITALS
HEIGHT: 73 IN | WEIGHT: 233 LBS | DIASTOLIC BLOOD PRESSURE: 89 MMHG | SYSTOLIC BLOOD PRESSURE: 135 MMHG | TEMPERATURE: 97.7 F | BODY MASS INDEX: 30.88 KG/M2

## 2023-06-07 DIAGNOSIS — Z98.890 S/P DISCECTOMY: ICD-10-CM

## 2023-06-07 DIAGNOSIS — M51.36 DISC DEGENERATION, LUMBAR: ICD-10-CM

## 2023-06-07 DIAGNOSIS — M54.16 LEFT LUMBAR RADICULOPATHY: Primary | ICD-10-CM

## 2023-06-07 PROBLEM — F41.9 ANXIETY: Status: ACTIVE | Noted: 2023-06-07

## 2023-06-07 PROBLEM — L74.510 HYPERHIDROSIS OF AXILLA: Status: ACTIVE | Noted: 2023-06-07

## 2023-06-07 PROBLEM — D72.829 LEUKOCYTOSIS: Status: ACTIVE | Noted: 2023-06-07

## 2023-06-07 PROBLEM — R17 ELEVATED BILIRUBIN: Status: ACTIVE | Noted: 2023-06-07

## 2023-06-07 PROCEDURE — 99213 OFFICE O/P EST LOW 20 MIN: CPT | Performed by: PHYSICIAN ASSISTANT

## 2023-06-07 NOTE — ASSESSMENT & PLAN NOTE
His wife is accompanying him today in feels he would benefit from medication to help with some anxiety issues with new onset health concerns.  Has been off work for a number of weeks due to back issues and has been agitated since having to be so sedentary.  Patient is in agreement he may benefit from temporary medication as well.   -Initiate Wellbutrin  mg once daily.  Return in 4 weeks to monitor for effect

## 2023-06-07 NOTE — ASSESSMENT & PLAN NOTE
Patient with complaints of longstanding excessive sweating of the armpits.  Tried different antiperspirants and deodorants. reports he even has excessive sweating when in a swimming pool.  -Will prescribe glycopyrrolate and monitor for effect

## 2023-06-07 NOTE — ASSESSMENT & PLAN NOTE
Patient presented to the emergency department with complaints of sore throat, yellowing of skin and eyes.  Found to have significantly elevated white count at time of presentation.  Repeat white count today back to normal.  No further issues with fever or sore throat

## 2023-06-07 NOTE — LETTER
2023       No Recipients    Patient: Hammad Pressley   YOB: 1990   Date of Visit: 2023       Dear Dr. Marte Recipients:    Thank you for referring Hammad Pressley to me for evaluation. Below are the relevant portions of my assessment and plan of care.    If you have questions, please do not hesitate to call me. I look forward to following Hammad along with you.         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  He had a CT and then an MRI of his knee which were negative.  He was also treated with a steroid pack.  Ultimately symptoms improved.  When I saw him last I return him to work.  He was doing well until May 15 when he had an exacerbation of back and left leg pain.  They try to get in our office but could not get an appointment.  His PCP gave him a Toradol injection.  Ultimately his symptoms improved yet again.  He has occasional pain in the left leg with prolonged standing but otherwise is doing well and feels ready to go back to work.      History reviewed. No pertinent past medical history.      Current Outpatient Medications:   •  buPROPion XL (Wellbutrin XL) 150 MG 24 hr tablet, Take 1 tablet by mouth Daily for 30 days., Disp: 30 tablet, Rfl: 0  •  glycopyrrolate (ROBINUL) 1 MG tablet, Take 1 tablet by mouth 2 (Two) Times a Day for 30 days., Disp: 60 tablet, Rfl: 0    Past Surgical History:   Procedure Laterality Date   • BACK SURGERY     • SHOULDER SURGERY          Social History     Socioeconomic History   • Marital status:    Tobacco Use   • Smoking status: Never   • Smokeless tobacco: Never   Vaping Use   • Vaping Use: Never used   Substance and Sexual Activity   • Alcohol use: Yes     Alcohol/week: 1.0 standard drink     Types: 1 Cans of beer per week   • Drug use: Never   • Sexual activity: Defer         Review of Systems   Constitutional:  Negative for activity change, appetite change, chills, diaphoresis, fatigue, fever and unexpected weight change.   HENT:  Negative for congestion, dental problem, drooling, ear discharge, ear pain, facial swelling, hearing loss, mouth sores, nosebleeds, postnasal drip, rhinorrhea, sinus pressure, sinus pain, sneezing, sore throat, tinnitus, trouble swallowing and voice change.    Eyes:  Negative for photophobia, pain, discharge, redness, itching and visual disturbance.   Respiratory:  Negative for apnea, cough, choking, chest tightness, shortness of breath, wheezing and stridor.    Cardiovascular:  Negative for chest pain, palpitations and leg swelling.   Gastrointestinal:  Negative for abdominal distention, abdominal pain, anal bleeding, blood in stool, constipation, diarrhea, nausea, rectal pain and vomiting.   Endocrine: Negative for cold intolerance, heat intolerance, polydipsia, polyphagia and polyuria.   Genitourinary:  Negative for decreased urine volume, difficulty urinating, dysuria, enuresis, flank pain, frequency, genital sores, hematuria and urgency.   Musculoskeletal:  Positive for back pain. Negative for arthralgias, gait problem, joint swelling, myalgias, neck pain and neck stiffness.   Skin:  Negative for color change, pallor, rash and wound.   Allergic/Immunologic: Negative for environmental allergies, food allergies and immunocompromised state.   Neurological:  Negative for dizziness, tremors, seizures, syncope, facial asymmetry, speech difficulty, weakness, light-headedness, numbness and headaches.  "  Hematological:  Negative for adenopathy. Does not bruise/bleed easily.   Psychiatric/Behavioral:  Negative for agitation, behavioral problems, confusion, decreased concentration, dysphoric mood, hallucinations, self-injury, sleep disturbance and suicidal ideas. The patient is not nervous/anxious and is not hyperactive.      Objective   Vital Signs: Blood pressure 135/89, temperature 97.7 °F (36.5 °C), height 185.4 cm (72.99\"), weight 106 kg (233 lb).  Physical Exam  Vitals and nursing note reviewed.   Constitutional:       General: He is not in acute distress.     Appearance: He is well-developed.   Eyes:      Pupils: Pupils are equal, round, and reactive to light.   Cardiovascular:      Heart sounds: Normal heart sounds.   Pulmonary:      Breath sounds: Normal breath sounds.   Psychiatric:         Behavior: Behavior normal.         Thought Content: Thought content normal.   Musculoskeletal:     Strength is intact in upper and lower extremities to direct testing.     Station and gait are normal.     Straight leg raising is negative.   Neurologic:     Muscle tone is normal throughout.     Sensation is intact to light touch throughout.     Patient is oriented to person, place, and time.         Independent review of radiographic imaging: MRI of the lumbar spine demonstrates focal degenerative disc at L5-S1.  There is postoperative changes from previous discectomy at L5-S1 on the left.  There appears to be at least some recurrent left paracentral disc protrusion as well as scar.    Assessment & Plan   Diagnosis: Recurrent disc herniation with radiculopathy, improved.     Medical Decision Making: Patient had another exacerbation of back and left leg pain that has already improved with time.  He also had a Toradol injection.  I am going to return him to work.  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  He will call me if he has any difficulties moving forward.    Diagnoses and all orders for this " visit:    1. Left lumbar radiculopathy (Primary)    2. Disc degeneration, lumbar    3. S/P discectomy                              Rossy Dunne PA-C  Patient Care Team:  Aylin Monique APRN as PCP - General (Family Medicine)

## 2023-06-07 NOTE — PROGRESS NOTES
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 Cali[facility] for undergoing L5-S1 discectomy on the left in .  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  He had a CT and then an MRI of his knee which were negative.  He was also treated with a steroid pack.  Ultimately symptoms improved.  When I saw him last I return him to work.  He was doing well until May 15 when he had an exacerbation of back and left leg pain.  They try to get in our office but could not get an appointment.  His PCP gave him a Toradol injection.  Ultimately his symptoms improved yet again.  He has occasional pain in the left leg with prolonged standing but otherwise is doing well and feels ready to go back to work.      History reviewed. No pertinent past medical history.      Current Outpatient Medications:     buPROPion XL (Wellbutrin XL) 150 MG 24 hr tablet, Take 1 tablet by mouth Daily for 30 days., Disp: 30 tablet, Rfl: 0    glycopyrrolate (ROBINUL) 1 MG tablet, Take 1 tablet by mouth 2 (Two) Times a Day for 30 days., Disp: 60 tablet, Rfl: 0    Past Surgical History:   Procedure Laterality Date    BACK SURGERY      SHOULDER SURGERY         Social History     Socioeconomic History    Marital status:    Tobacco Use    Smoking status: Never    Smokeless tobacco: Never   Vaping Use    Vaping Use: Never used   Substance and Sexual Activity    Alcohol use: Yes     Alcohol/week: 1.0 standard drink     Types: 1 Cans of beer per week    Drug use: Never    Sexual activity: Defer         Review of Systems   Constitutional:  Negative for activity change, appetite change, chills, diaphoresis, fatigue, fever and unexpected weight change.   HENT:  Negative for congestion,  "dental problem, drooling, ear discharge, ear pain, facial swelling, hearing loss, mouth sores, nosebleeds, postnasal drip, rhinorrhea, sinus pressure, sinus pain, sneezing, sore throat, tinnitus, trouble swallowing and voice change.    Eyes:  Negative for photophobia, pain, discharge, redness, itching and visual disturbance.   Respiratory:  Negative for apnea, cough, choking, chest tightness, shortness of breath, wheezing and stridor.    Cardiovascular:  Negative for chest pain, palpitations and leg swelling.   Gastrointestinal:  Negative for abdominal distention, abdominal pain, anal bleeding, blood in stool, constipation, diarrhea, nausea, rectal pain and vomiting.   Endocrine: Negative for cold intolerance, heat intolerance, polydipsia, polyphagia and polyuria.   Genitourinary:  Negative for decreased urine volume, difficulty urinating, dysuria, enuresis, flank pain, frequency, genital sores, hematuria and urgency.   Musculoskeletal:  Positive for back pain. Negative for arthralgias, gait problem, joint swelling, myalgias, neck pain and neck stiffness.   Skin:  Negative for color change, pallor, rash and wound.   Allergic/Immunologic: Negative for environmental allergies, food allergies and immunocompromised state.   Neurological:  Negative for dizziness, tremors, seizures, syncope, facial asymmetry, speech difficulty, weakness, light-headedness, numbness and headaches.   Hematological:  Negative for adenopathy. Does not bruise/bleed easily.   Psychiatric/Behavioral:  Negative for agitation, behavioral problems, confusion, decreased concentration, dysphoric mood, hallucinations, self-injury, sleep disturbance and suicidal ideas. The patient is not nervous/anxious and is not hyperactive.      Objective   Vital Signs: Blood pressure 135/89, temperature 97.7 °F (36.5 °C), height 185.4 cm (72.99\"), weight 106 kg (233 lb).  Physical Exam  Vitals and nursing note reviewed.   Constitutional:       General: He is not in " acute distress.     Appearance: He is well-developed.   Eyes:      Pupils: Pupils are equal, round, and reactive to light.   Cardiovascular:      Heart sounds: Normal heart sounds.   Pulmonary:      Breath sounds: Normal breath sounds.   Psychiatric:         Behavior: Behavior normal.         Thought Content: Thought content normal.   Musculoskeletal:     Strength is intact in upper and lower extremities to direct testing.     Station and gait are normal.     Straight leg raising is negative.   Neurologic:     Muscle tone is normal throughout.     Sensation is intact to light touch throughout.     Patient is oriented to person, place, and time.         Independent review of radiographic imaging: MRI of the lumbar spine demonstrates focal degenerative disc at L5-S1.  There is postoperative changes from previous discectomy at L5-S1 on the left.  There appears to be at least some recurrent left paracentral disc protrusion as well as scar.    Assessment & Plan   Diagnosis: Recurrent disc herniation with radiculopathy, improved.     Medical Decision Making: Patient had another exacerbation of back and left leg pain that has already improved with time.  He also had a Toradol injection.  I am going to return him to work.  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  He will call me if he has any difficulties moving forward.    Diagnoses and all orders for this visit:    1. Left lumbar radiculopathy (Primary)    2. Disc degeneration, lumbar    3. S/P discectomy                              Rossy Dunne PA-C  Patient Care Team:  Aylin Monique APRN as PCP - General (Family Medicine)

## 2023-06-07 NOTE — ASSESSMENT & PLAN NOTE
Patient had significantly elevated bilirubin on presentation to ED.  He was referred to Dr. Rucker as outpatient and has had initial evaluation.  Repeat blood work was obtained showing liver function and bilirubin back to normal levels.  He has upcoming liver ultrasound for further evaluation

## 2023-06-07 NOTE — TELEPHONE ENCOUNTER
----- Message from ROSETTA Medrano sent at 6/7/2023  8:15 AM EDT -----  Please let Hammad know all of his labs are now within normal limits.

## 2023-07-03 PROBLEM — Z00.00 ANNUAL PHYSICAL EXAM: Status: ACTIVE | Noted: 2023-07-03

## 2023-09-06 RX ORDER — GLYCOPYRROLATE 1 MG/1
1 TABLET ORAL 2 TIMES DAILY
Qty: 60 TABLET | Refills: 1 | Status: SHIPPED | OUTPATIENT
Start: 2023-09-06

## 2023-10-10 ENCOUNTER — OFFICE VISIT (OUTPATIENT)
Dept: FAMILY MEDICINE CLINIC | Facility: CLINIC | Age: 33
End: 2023-10-10
Payer: COMMERCIAL

## 2023-10-10 VITALS
TEMPERATURE: 98.2 F | WEIGHT: 222 LBS | HEIGHT: 73 IN | DIASTOLIC BLOOD PRESSURE: 74 MMHG | HEART RATE: 72 BPM | BODY MASS INDEX: 29.42 KG/M2 | OXYGEN SATURATION: 98 % | RESPIRATION RATE: 18 BRPM | SYSTOLIC BLOOD PRESSURE: 118 MMHG

## 2023-10-10 DIAGNOSIS — M25.532 LEFT WRIST PAIN: Primary | ICD-10-CM

## 2023-10-10 DIAGNOSIS — L74.510 HYPERHIDROSIS OF AXILLA: ICD-10-CM

## 2023-10-10 DIAGNOSIS — F41.9 ANXIETY: ICD-10-CM

## 2023-10-10 PROCEDURE — 99214 OFFICE O/P EST MOD 30 MIN: CPT | Performed by: NURSE PRACTITIONER

## 2023-10-10 NOTE — ASSESSMENT & PLAN NOTE
presents today for left wrist pain that began three weeks ago.  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  Patient reports when pain first began he was unable to lift a pitchfork, but now has improved to where he can complete that task.  He notes an additional time of pain there was also swelling noted but improved with ice compress.  He has not utilized any Tylenol or ibuprofen for the pain.  He has applied ice on 1-2 occasions with improvement in swelling.he has also been utilizing a brace for his left wrist when at work.  He notes pain most predominantly when pushing or pulling.  He feels there is some decreased range of motion with flexion of the wrist.  No bruising, finger numbness or tingling.  Pain is isolated to just below the thumb on the medial aspect of the wrist.  He does feel that the pain is improving since onset.  Physical exam positive for some tenderness of the medial aspect of the wrist on palpation.  -We will send for x-ray to rule out bony abnormality.  Likely a wrist sprain or strain, advised to utilize ice compress 3-4 times daily for 15 minutes in duration.  Patient is to continue to wear brace for stability particularly during work.  Also advised use of NSAIDs regularly for 2 to 3 days to help with inflammation.

## 2023-10-10 NOTE — ASSESSMENT & PLAN NOTE
Patient initiated on Wellbutrin 4 months ago for anxiety that developed after being stuck at home on work leave due to a back injury.  He continues to find good benefit in the medication in regards to decreased agitation and irritability.  -Continue Wellbutrin 300 mg once daily

## 2023-10-10 NOTE — ASSESSMENT & PLAN NOTE
Patient has longstanding excessive sweating of the armpits.  He has tried different antiperspirants and deodorants without benefit.  He reported excessive sweating even when in a swimming pool.  He has been taking glycopyrrolate 1 mg twice daily for the last several months with significant benefit

## 2023-10-10 NOTE — PROGRESS NOTES
Office Note     Name: Hammad Pressley    : 1990     MRN: [id number]     Chief Complaint  left wrist pain    Subjective     History of Present Illness:  Hammad Pressley is a 33 y.o. male who presents today for left wrist pain that began three weeks ago.  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  Patient reports when pain first began he was unable to lift a pitchfork, but now has improved to where he can complete that task.  He notes an additional time of pain there was also swelling noted but improved with ice compress.  He has not utilized any Tylenol or ibuprofen for the pain.  He has applied ice on 1-2 occasions with improvement in swelling.he has also been utilizing a brace for his left wrist when at work.  He notes pain most predominantly when pushing or pulling.  He feels there is some decreased range of motion with flexion of the wrist.  No bruising, finger numbness or tingling.  Pain is isolated to just below the thumb on the medial aspect of the wrist.  He continues to receive good benefit from glycopyrrolate in regards to hyperhidrosis of the axilla.  He also feels his Wellbutrin continues to provide adequate mood stability in regards to mild anxiety that developed approximately 6 months ago during a time he was out of work due to back injury.  He has no further complaints or concerns today  Review of Systems   Constitutional:  Negative for chills, fatigue and fever.   Respiratory:  Positive for cough. Negative for shortness of breath.    Cardiovascular:  Negative for chest pain, palpitations and leg swelling.   Musculoskeletal:  Positive for arthralgias, joint swelling and myalgias.   Skin:  Negative for rash, wound and bruise.   Neurological:  Negative for dizziness, light-headedness and headache.       Objective     History reviewed. No pertinent past medical history.  Past Surgical History:   Procedure Laterality  "Date    BACK SURGERY      SHOULDER SURGERY       History reviewed. No pertinent family history.    Vital Signs  /74 (BP Location: Left arm, Patient Position: Sitting, Cuff Size: Adult)   Pulse 72   Temp 98.2 øF (36.8 øC) (Temporal)   Resp 18   Ht 185.4 cm (72.99\")   Wt 101 kg (222 lb)   SpO2 98%   BMI 29.30 kg/mý   Estimated body mass index is 29.3 kg/mý as calculated from the following:    Height as of this encounter: 185.4 cm (72.99\").    Weight as of this encounter: 101 kg (222 lb).    Physical Exam  Vitals reviewed.   Constitutional:       Appearance: Normal appearance.   HENT:      Head: Normocephalic and atraumatic.   Cardiovascular:      Rate and Rhythm: Normal rate and regular rhythm.      Pulses: Normal pulses.      Heart sounds: Normal heart sounds.   Pulmonary:      Effort: Pulmonary effort is normal.      Breath sounds: Normal breath sounds.   Musculoskeletal:      Left wrist: Tenderness present. No swelling, deformity or crepitus. Decreased range of motion.   Skin:     General: Skin is warm and dry.   Neurological:      Mental Status: He is alert and oriented to person, place, and time.             POCT Results (if applicable):  Results for orders placed or performed in visit on 07/03/23   TSH Rfx On Abnormal To Free T4    Specimen: Arm, Left; Blood    Blood  Release to ever   Result Value Ref Range    TSH 1.350 0.450 - 4.500 uIU/mL   Lipid Panel    Specimen: Arm, Left; Blood    Blood  Release to ever   Result Value Ref Range    Total Cholesterol 158 100 - 199 mg/dL    Triglycerides 171 (H) 0 - 149 mg/dL    HDL Cholesterol 36 (L) >39 mg/dL    VLDL Cholesterol Bryce 30 5 - 40 mg/dL    LDL Chol Calc (NIH) 92 0 - 99 mg/dL   Comprehensive Metabolic Panel    Specimen: Arm, Left; Blood    Blood  Release to ever   Result Value Ref Range    Glucose 87 70 - 99 mg/dL    BUN 10 6 - 20 mg/dL    Creatinine 1.13 0.76 - 1.27 mg/dL    EGFR Result 88 >59 mL/min/1.73    BUN/Creatinine Ratio 9 9 - 20    Sodium " 139 134 - 144 mmol/L    Potassium 4.2 3.5 - 5.2 mmol/L    Chloride 100 96 - 106 mmol/L    Total CO2 25 20 - 29 mmol/L    Calcium 9.9 8.7 - 10.2 mg/dL    Total Protein 7.5 6.0 - 8.5 g/dL    Albumin 4.8 4.0 - 5.0 g/dL    Globulin 2.7 1.5 - 4.5 g/dL    A/G Ratio 1.8 1.2 - 2.2    Total Bilirubin 1.7 (H) 0.0 - 1.2 mg/dL    Alkaline Phosphatase 74 44 - 121 IU/L    AST (SGOT) 27 0 - 40 IU/L    ALT (SGPT) 42 0 - 44 IU/L   Hepatitis C Antibody    Specimen: Arm, Left; Blood    Blood  Release to ever   Result Value Ref Range    Hep C Virus Ab Non Reactive Non Reactive            Assessment and Plan     Diagnoses and all orders for this visit:    1. Left wrist pain (Primary)  Assessment & Plan:  presents today for left wrist pain that began three weeks ago.  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  Patient reports when pain first began he was unable to lift a pitchfork, but now has improved to where he can complete that task.  He notes an additional time of pain there was also swelling noted but improved with ice compress.  He has not utilized any Tylenol or ibuprofen for the pain.  He has applied ice on 1-2 occasions with improvement in swelling.he has also been utilizing a brace for his left wrist when at work.  He notes pain most predominantly when pushing or pulling.  He feels there is some decreased range of motion with flexion of the wrist.  No bruising, finger numbness or tingling.  Pain is isolated to just below the thumb on the medial aspect of the wrist.  He does feel that the pain is improving since onset.  Physical exam positive for some tenderness of the medial aspect of the wrist on palpation.  -We will send for x-ray to rule out bony abnormality.  Likely a wrist sprain or strain, advised to utilize ice compress 3-4 times daily for 15 minutes in duration.  Patient is to continue to wear brace for stability particularly during work.   Also advised use of NSAIDs regularly for 2 to 3 days to help with inflammation.    Orders:  -     XR Wrist 3+ View Left; Future    2. Anxiety  Assessment & Plan:  Patient initiated on Wellbutrin 4 months ago for anxiety that developed after being stuck at home on work leave due to a back injury.  He continues to find good benefit in the medication in regards to decreased agitation and irritability.  -Continue Wellbutrin 300 mg once daily      3. Hyperhidrosis of axilla  Assessment & Plan:  Patient has longstanding excessive sweating of the armpits.  He has tried different antiperspirants and deodorants without benefit.  He reported excessive sweating even when in a swimming pool.  He has been taking glycopyrrolate 1 mg twice daily for the last several months with significant benefit                 Follow Up  No follow-ups on file.    Aylin Monique, APRN

## 2023-10-12 ENCOUNTER — TELEPHONE (OUTPATIENT)
Dept: FAMILY MEDICINE CLINIC | Facility: CLINIC | Age: 33
End: 2023-10-12
Payer: COMMERCIAL

## 2023-10-12 DIAGNOSIS — M25.532 LEFT WRIST PAIN: ICD-10-CM

## 2023-10-12 DIAGNOSIS — S62.001A CLOSED DISPLACED FRACTURE OF SCAPHOID OF RIGHT WRIST, UNSPECIFIED PORTION OF SCAPHOID, INITIAL ENCOUNTER: Primary | ICD-10-CM

## 2023-10-12 NOTE — TELEPHONE ENCOUNTER
Patient called back and I advised him of his results and that we are sending him to ortho. He verbalized understanding

## 2023-10-12 NOTE — TELEPHONE ENCOUNTER
----- Message from ROSETTA Medrano sent at 10/12/2023 11:29 AM EDT -----  Please let Hammad know his wrist x-ray is showing a scaphoid fracture. Needs ortho referral

## 2023-10-17 ENCOUNTER — HOSPITAL ENCOUNTER (OUTPATIENT)
Dept: GENERAL RADIOLOGY | Facility: HOSPITAL | Age: 33
Discharge: HOME | End: 2023-10-17
Admitting: STUDENT IN AN ORGANIZED HEALTH CARE EDUCATION/TRAINING PROGRAM
Payer: COMMERCIAL

## 2023-10-17 ENCOUNTER — OFFICE VISIT (OUTPATIENT)
Age: 33
End: 2023-10-17
Payer: COMMERCIAL

## 2023-10-17 VITALS
DIASTOLIC BLOOD PRESSURE: 70 MMHG | WEIGHT: 216.6 LBS | BODY MASS INDEX: 28.71 KG/M2 | HEIGHT: 73 IN | SYSTOLIC BLOOD PRESSURE: 132 MMHG

## 2023-10-17 DIAGNOSIS — S62.022A DISPLACED FRACTURE OF MIDDLE THIRD OF NAVICULAR (SCAPHOID) BONE OF LEFT WRIST, INITIAL ENCOUNTER FOR CLOSED FRACTURE: Primary | ICD-10-CM

## 2023-10-17 DIAGNOSIS — M25.532 LEFT WRIST PAIN: ICD-10-CM

## 2023-10-17 DIAGNOSIS — M25.532 LEFT WRIST PAIN: Primary | ICD-10-CM

## 2023-10-17 PROCEDURE — 73110 X-RAY EXAM OF WRIST: CPT

## 2023-10-17 NOTE — PROGRESS NOTES
"                                                                 [facility]     Office Visit       Date: 10/17/2023   Patient Name: Hammad Pressley  MRN: [id number]  YOB: 1990    Referring Physician: Aylin Monique APRN     Chief Complaint:   Chief Complaint   Patient presents with    Left Wrist - Pain     History of Present Illness:   Hammad Pressley is a 33 y.o. male left-hand-dominant presenting to clinic as a new patient with complaints of left wrist pain.  Patient is unsure how long this has been going on but feels it is been about 1 month.  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  He had pain and associated swelling that was not improving which prompted a visit to his primary care physician.  X-rays were obtained demonstrating a scaphoid waist fracture and the patient was instructed to follow-up.  He has not been immobilized since the time of injury.  He denies any numbness or tingling.  No other complaints or concerns.    Patient works as a .    Subjective   Review of Systems:   Review of Systems   Constitutional: Negative.    HENT: Negative.     Eyes: Negative.    Respiratory: Negative.     Cardiovascular: Negative.    Gastrointestinal: Negative.    Endocrine: Negative.    Genitourinary: Negative.    Musculoskeletal:  Positive for arthralgias.   Skin: Negative.    Allergic/Immunologic: Negative.    Neurological: Negative.    Hematological: Negative.    Psychiatric/Behavioral: Negative.          Pertinent review of systems per HPI    I reviewed the patient's chief complaint, history of present illness, review of systems, past medical history, surgical history, family history, social history, medications and allergy list in the EMR on 10/17/2023 and agree with the findings above.    Objective    Vital Signs:   Vitals:    10/17/23 1535   BP: 132/70   Weight: 98.2 kg (216 lb 9.6 oz)   Height: 185.4 cm (73\")     General: No " acute distress. Alert and oriented.     Ortho Exam:  Examination of the left upper extremity demonstrates no skin lesions or abrasions.  There is mild swelling along the radial aspect of the wrist.  Tenderness to the anatomic snuffbox.  Nontender at the SL interval.  Pain with wrist extension.  Full wrist flexion and 40 degrees of wrist extension.  Sensation intact light touch about the hand.  Warm and well-perfused distally.    Imaging / Studies:    Imaging Results (Last 24 Hours)       ** No results found for the last 24 hours. **        Left wrist x-rays obtained on 10/17/2020 are personally reviewed.  These demonstrate evidence of a scaphoid waist fracture with likely cyst formation at the fracture site.  This appears minimally displaced both in the AP and lateral views.    Procedures:  Procedures    Assessment / Plan    Assessment/Plan:   Hammad Pressleyis a 33 y.o. male with a left scaphoid waist fracture.    I discussed with the patient their clinical and radiographic findings demonstrate a scaphoid waist fracture.  We had a lengthy discussion regarding the pathophysiology of their diagnosis.  The patient is unsure when the injury was and has not been immobilized.  I am suspicious for bony resorption based on his x-rays today and would like to get a CT scan to evaluate for fracture displacement as well as resorption at the fracture site.  He will be placed in a short arm cast today.  Both conservative and surgical options were discussed.  Conservative treatments in the form of: Casting were presented.  Operative treatments in the form of open reduction term fixation were presented.  I would like to review the CT scan to determine the best option for the patient which at this time I feel is open reduction internal fixation given the delay in treatment and bony resorption noted on radiographs. They were agreeable with the plan.  All questions and concerns were addressed.      ICD-10-CM ICD-9-CM   1. Displaced  fracture of middle third of navicular (scaphoid) bone of left wrist, initial encounter for closed fracture  S62.022A 814.01     Follow Up:   Return for Follow Up After Testing.      Brianne Napoles MD  [facility] [place] [facility]

## 2023-10-27 ENCOUNTER — HOSPITAL ENCOUNTER (OUTPATIENT)
Dept: CT IMAGING | Facility: HOSPITAL | Age: 33
Discharge: HOME | End: 2023-10-27
Admitting: STUDENT IN AN ORGANIZED HEALTH CARE EDUCATION/TRAINING PROGRAM
Payer: COMMERCIAL

## 2023-10-27 ENCOUNTER — TELEPHONE (OUTPATIENT)
Dept: FAMILY MEDICINE CLINIC | Facility: CLINIC | Age: 33
End: 2023-10-27
Payer: COMMERCIAL

## 2023-10-27 DIAGNOSIS — F41.9 ANXIETY: ICD-10-CM

## 2023-10-27 DIAGNOSIS — S62.022A DISPLACED FRACTURE OF MIDDLE THIRD OF NAVICULAR (SCAPHOID) BONE OF LEFT WRIST, INITIAL ENCOUNTER FOR CLOSED FRACTURE: ICD-10-CM

## 2023-10-27 PROCEDURE — 73200 CT UPPER EXTREMITY W/O DYE: CPT

## 2023-10-27 RX ORDER — BUPROPION HYDROCHLORIDE 300 MG/1
300 TABLET ORAL DAILY
Qty: 90 TABLET | Refills: 0 | Status: SHIPPED | OUTPATIENT
Start: 2023-10-27

## 2023-10-27 NOTE — PROGRESS NOTES
Can we call to see if the patient can come in on Monday to discuss results and the plan moving forward? Thank you.

## 2023-10-27 NOTE — TELEPHONE ENCOUNTER
----- Message from Hammad Pressley sent at 10/27/2023  8:49 AM EDT -----  Regarding: Refill  Contact: [phone]  Need to refill Bupropion

## 2023-10-31 ENCOUNTER — OFFICE VISIT (OUTPATIENT)
Age: 33
End: 2023-10-31
Payer: COMMERCIAL

## 2023-10-31 VITALS
BODY MASS INDEX: 28.69 KG/M2 | SYSTOLIC BLOOD PRESSURE: 128 MMHG | DIASTOLIC BLOOD PRESSURE: 88 MMHG | WEIGHT: 216.49 LBS | HEIGHT: 73 IN

## 2023-10-31 DIAGNOSIS — S62.022A DISPLACED FRACTURE OF MIDDLE THIRD OF NAVICULAR (SCAPHOID) BONE OF LEFT WRIST, INITIAL ENCOUNTER FOR CLOSED FRACTURE: Primary | ICD-10-CM

## 2023-10-31 NOTE — PROGRESS NOTES
[facility] Orthopedic     Office Visit       Date: 10/31/2023   Patient Name: Hammad Pressley  MRN: [id number]  YOB: 1990    Referring Physician: No ref. provider found     Chief Complaint:   Chief Complaint   Patient presents with    Follow-up     2 week follow up; Displaced fracture of middle third of navicular (scaphoid) bone of left wrist-CT completed on 10/27/23     History of Present Illness:   Hammad Pressley is a 33 y.o. male left-hand-dominant return to clinic for follow-up of left wrist CT results.  Patient injured his wrist approximately 1 to 2 months ago, specific date of injury is unknown.  He developed pain and swelling that did not improve and x-rays were obtained demonstrating a scaphoid waist fracture.  He was placed in a short arm cast at his last visit and reports no difficulties with cast wear.  He has been able to work with the cast.  No numbness or tingling.  No other complaints or concerns.    Subjective   Review of Systems:   Review of Systems   Constitutional: Negative.    HENT: Negative.     Eyes: Negative.    Respiratory: Negative.     Cardiovascular: Negative.    Gastrointestinal: Negative.    Endocrine: Negative.    Genitourinary: Negative.    Musculoskeletal:  Positive for arthralgias.   Skin: Negative.    Allergic/Immunologic: Negative.    Neurological: Negative.    Hematological: Negative.    Psychiatric/Behavioral: Negative.        Pertinent review of systems per HPI    I reviewed the patient's chief complaint, history of present illness, review of systems, past medical history, surgical history, family history, social history, medications and allergy list in the EMR on 10/31/2023 and agree with the findings above.    Objective    Quality Measures:   ACP:   ACP discussion was declined by the patient.      Tobacco:   Hammad Pressley  reports that he has never smoked. He has never used smokeless tobacco..  "    Vital Signs:   Vitals:    10/31/23 1445   BP: 128/88   Weight: 98.2 kg (216 lb 7.9 oz)   Height: 185.4 cm (72.99\")     BMI:      General: No acute distress. Alert and oriented.     Ortho Exam:  Examination of the left upper extremity demonstrates a well placed short arm cast.  No evidence of skin irritation or breakdown.  He is able to make a composite fist as allowed by the cast.  Sensation intact to light touch throughout the hand.  Warm well-perfused distally.  Imaging / Studies:    Imaging Results (Last 24 Hours)       ** No results found for the last 24 hours. **        CT of the left wrist obtained on 10/27/2023 were personally reviewed.  These demonstrate a scaphoid waist fracture with no evidence of healing.  There is resorption noted at the fracture site and minimal flexion noted on the sagittal views.    Assessment / Plan    Assessment/Plan:   Hammad Allen a 33 y.o. male with a left scaphoid waist fracture, date of injury unknown.    I discussed with the patient their clinical and radiographic findings demonstrate a scaphoid waist fracture with bone resorption and no evidence of healing.  We had a lengthy discussion regarding the pathophysiology of their diagnosis.  Based on the CT images, I would recommend surgical intervention with open reduction internal fixation.  His delayed diagnosis without immobilization puts him at an increased risk of nonunion.  Additionally there are no signs of healing on CT scan with displacement and bone resorption noted.  I discussed the need for distal radius bone graft to fill the bony void at the fracture site in addition to screw fixation.  Furthermore I discussed conservative measures such as casting would put him at an increased risk of nonunion which could lead to advanced radiocarpal and midcarpal arthritis. After expressing understanding of all options, the patient elects to proceed with left scaphoid open reduction total fixation with distal radius bone " graft.      The risks and benefits of the procedure were discussed with the patient and/or appropriate guardian, which include but are not limited to: the risk of bleeding, pain, infection, wound complications, neurovascular damage, post-operative stiffness, tendon and/or ligament injury, persistent pain, need for additional surgeries in the future, and general risks from anesthesia.  Scaphoid ORIF risk include nonunion, malunion, damage to the dorsal radial sensory nerve, failure of fixation, hardware prominence, arthritis, and need for additional surgery. We also discussed the post-operative rehabilitation, length of immobilization, the need for therapy, and the overall expected outcomes from the procedure. Proper time was given to answer the patient's questions regarding the procedure. The patient expressed understanding. Knowing what the risks are and what the conservative treatment is, the patient elected to forgo any further conservative treatment options and proceed with the surgical intervention. They were agreeable with the plan.  All questions and concerns were addressed.       ICD-10-CM ICD-9-CM   1. Displaced fracture of middle third of navicular (scaphoid) bone of left wrist, initial encounter for closed fracture  S62.022A 814.01     Follow Up:   Return for Follow Up- After surgery.      Brianne Napoles MD  [facility]

## 2023-11-01 ENCOUNTER — OUTSIDE FACILITY SERVICE (OUTPATIENT)
Dept: ORTHOPEDIC SURGERY | Facility: CLINIC | Age: 33
End: 2023-11-01
Payer: COMMERCIAL

## 2023-11-01 ENCOUNTER — DOCUMENTATION (OUTPATIENT)
Age: 33
End: 2023-11-01
Payer: COMMERCIAL

## 2023-11-01 DIAGNOSIS — S62.022A DISPLACED FRACTURE OF MIDDLE THIRD OF NAVICULAR (SCAPHOID) BONE OF LEFT WRIST, INITIAL ENCOUNTER FOR CLOSED FRACTURE: Primary | ICD-10-CM

## 2023-11-01 PROCEDURE — 73100 X-RAY EXAM OF WRIST: CPT | Performed by: STUDENT IN AN ORGANIZED HEALTH CARE EDUCATION/TRAINING PROGRAM

## 2023-11-01 PROCEDURE — 25628 OPTX CARPL SCPHD FX INT FIXJ: CPT | Performed by: STUDENT IN AN ORGANIZED HEALTH CARE EDUCATION/TRAINING PROGRAM

## 2023-11-01 RX ORDER — OXYCODONE HYDROCHLORIDE 5 MG/1
5 TABLET ORAL EVERY 6 HOURS PRN
Qty: 20 TABLET | Refills: 0 | Status: SHIPPED | OUTPATIENT
Start: 2023-11-01

## 2023-11-01 NOTE — PROGRESS NOTES
ORTHOPEDIC OPERATIVE REPORT    PATIENT NAME: Hammad Pressley    MRN: [id number]    DATE OF SURGERY: 11/01/23     LOCATION: [facility]    PREOPERATIVE DIAGNOSIS:   Left scaphoid waist fracture     POSTOPERATIVE DIAGNOSIS:  Left scaphoid waist fracture    PROCEDURE PERFORMED:  Left scaphoid open reduction internal fixation    CPT:  13072    SURGEON: Brianne Napoles MD     ASSISTANT: None     ANESTHESIA:  Regional block with monitored anesthesia    SPECIMENS: None    TOURNIQUET TIME: 50 minutes    ESTIMATED BLOOD LOSS: Minimal    COMPLICATIONS: None    IMPLANTS: MedArtis 3.0 headless compression screw 22mm    INDICATIONS FOR PROCEDURE:  Hammad Pressley presented to the outpatient clinic with complaints of left wrist pain after sustaining injury to the left upper extremity.  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  He is unsure when this occurred but was likely 1 to 2 months prior to presentation.  Radiographs were obtained demonstrating evidence of a scaphoid waist fracture with bony resorption. Advanced imaging in the form of a CT scan was also obtained which further demonstrated a minimally displaced scaphoid waist fracture, no significant humpback deformity, evidence of resorption at the fracture site and no interval healing. I discussed with the patient/family the nature of scaphoid fractures and tenuous blood supply. I recommended surgery in the form of left scaphoid open reduction internal fixation with distal radius bone graft. Indications for operative treatment include: Delayed treatment and immobilization, resorption at the fracture site and no evidence of interval healing on CT scan. I briefly discussed conservative treatment with casting alone but discussed this would put him at a higher risk for nonunion given the subacute nature of the fracture without evidence of healing.  I also discussed that distal radius autograft may be used to fill the void at the fracture site in order to  improve healing.    The risks and benefits of the procedure were discussed with the patient and/or appropriate guardian, which include but are not limited to: the risk of bleeding, pain, infection, wound complications, neurovascular damage, post-operative stiffness, tendon and/or ligament injury, persistent pain, need for additional surgeries in the future, and general risks from anesthesia. Scaphoid ORIF specific risks include: nonunion, malunion, delayed union, failure of the hardware, damage to the dorsal cutaneous nerves of the wrist (radial and ulnar), wrist stiffness, decreased range of motion, need for future hardware removal, and need for additional procedures  We also discussed the post-operative rehabilitation, length of immobilization, the need for therapy, and the overall expected outcomes from the procedure. Proper time was given to answer the patient's questions regarding the procedure. The patient expressed understanding. Knowing what the risks are and what the conservative treatment is, the patient elected to forgo any further conservative treatment options and proceed with the surgical intervention.    DESCRIPTION OF PROCEDURE:   The patient was identified in the preoperative holding area utilizing two patient identifiers. A preoperative regional block was performed by the anesthesia team. They were taken back to the operating room and placed supine on the operative table.  A upper arm tourniquet was placed and the operative extremity was prepped and draped in a standard sterile fashion. A surgical time out was performed that confirmed the correct site, procedure and laterality. We verified that the patient had received IV antibiotics and exsanguinated the left upper extremity using an Esmarch bandage and inflated tourniquet to 250 mmHg.      A dorsal incision, approximately 4cm in length, just ulnar to Listers tubercle, in line with the EPL was marked out. A 15 blade scalpel was used to incise the  skin, care as taken to achieve hemostasis at the wound edges with bipolar electrocautery. The subcutaneous tissues were dissected with scissors and care was taken to avoid any crossing branches of the dorsal radial sensory nerve. A self retaining retractor was placed and the underlying extensor retinaculum was encountered and incised longitudinally in line with the skin incision to expose the EPL tendon. The EPL was released and retracted radially with the tendons of the second extensor compartment. The joint capsule was identified an a T-shaped capsulotomy was made sharply with care taken to avoid injury to the cartilage of the scaphoid and scapholunate ligament. The radial limb of the T-shaped capsulotomy was developed to provide enough expose of the fracture site. Care was taken to limit dissection along the dorsal scaphoid as to maintain capsular attachments and blood supply. With the wrist in flexion, the scaphoid fracture site was not readily identified as the cartilage dorsal surface was intact.  A hypodermic needle was placed easily in the area of the known fracture site based on advanced imaging, however the overlying cartilage was intact.  I elected not to take down the intact cartilage to clean out the fracture site, and decided that bone graft would be placed through the drill hole for the screw as to allow the cartilaginous surface to remain in place.  I then proceeded with distal radius bone graft harvest where Venkat's tubercle was exposed and removed with a rongeur. Once a cortical window was made, a small curette was used to obtain the bone graft. This was collected and stored on the back table for later use.     Attention was turned back to the scaphoid and the fracture held reduced. Once reduced and confirmed visually the k-wire for the 3.0mm MedArtis headless compression screw was placed in an antegrade manner starting just radial to the SL ligament insertion with its trajectory along the long  axis of the scaphoid aiming towards to thumb metacarpal. Mini-c-arm imaging confirmed appropriate reduction and central positioning of the k-wire in the PA, lateral, and oblique views. The k-wire was measured and a 22 mm screw was selected. The k-wire for the screw was advanced into the trapezium to prevent removal during drilling. An additional derotational k-wire was then placed.     The screw was drilled and countersunk. The distal radius bone graft was then tamped into the drilled hole, filling the area of bony resorption at the level of the scaphoid waist.  The appropriately sized screw was placed without difficulty. The distal radius bone graft harvest site was filled with surgifoam to aid in hemostasis. Reduction, central screw placement and length was confirmed both visually and radiographically. There was no evidence of screw prominence and the proximal aspect of the screw was visually confirmed to be below the subchondral surface. The SL ligament was inspected and found to be without injury. Final xrays were obtained in the PA, lateral and oblique views.     The dorsal wrist capsule was repaired with 2-0 Vicryl. The tourniquet was let down and hemostasis was achieved with the bipolar electrocautery.  The subcutaneous layer was closed using interrupted 4-0 Monocryl and the skin was closed using 4-0 Monocryl.  Sterile dressings were applied to the incision and the patient was placed in a short arm plaster thumb spica splint. The patient was awoken from anesthesia and transferred to the PACU in good and stable condition. All counts were correct at the end the case.     POSTOPERATIVE PLAN:  1. Non weight bearing left upper extremity. Do not remove the splint.   2. Oxycodone 5mg for pain control. A prescription was provided. Over the counter Tylenol and ibuprofen may be used in addition to the prescribed narcotic.   3. Digit range of motion as allowed by the splint is encouraged.   4. Follow up: 10-14 days      Brianne Napoles MD  [facility] Sur[facility]

## 2023-11-02 ENCOUNTER — TELEPHONE (OUTPATIENT)
Dept: ORTHOPEDIC SURGERY | Facility: CLINIC | Age: 33
End: 2023-11-02
Payer: COMMERCIAL

## 2023-11-02 ENCOUNTER — DOCUMENTATION (OUTPATIENT)
Dept: ORTHOPEDIC SURGERY | Facility: CLINIC | Age: 33
End: 2023-11-02
Payer: COMMERCIAL

## 2023-11-02 NOTE — TELEPHONE ENCOUNTER
I called and spoke with the patient.  He is taking the Oxycodone every 6 hours and taking 1,000 mg of tylenol 2 hours after the Oxycodone. He states that it is not helping much if any. He stated that he does have a splint on so he is not able to ice.  He is keeping it elevated.  Please advise. Thanks. Yoli

## 2023-11-02 NOTE — TELEPHONE ENCOUNTER
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  May take oxycodone 1 every 4 hours for the next 48 hours.  Make sure he is taking 1000 mg of Tylenol/acetaminophen 3 times a day.  Additionally may add in up to 800 mg ibuprofen 3 times a day.    Make sure patient is elevating extremity.

## 2023-11-02 NOTE — TELEPHONE ENCOUNTER
Patient had sx yesterday 11/1/23. Wife calling to report patient is in pain as of last night 7:30pm when nerve block wore off. Wife states patient has been taking oxycodone and tylenol as suggested however patient is still in discomfort.     Patient reports pain level at a 6.currently   Last dose of oxycodone was at 4am and Tyenol at 6am.     Please advise

## 2023-11-02 NOTE — PROGRESS NOTES
Left Wrist X-Ray    Date: 11/1/2023    Indication: Intra op imaging     Views:  AP, Lateral, and Oblique     Comparison:  CT scan left wrist 10/27/2023    Findings:  Status post left scaphoid open reduction internal fixation with distal radius bone graft.  Fracture is well aligned and appropriate screw placement.    Impression:   Status post left scaphoid ORIF with appropriate fracture reduction and screw placement

## 2023-11-10 DIAGNOSIS — Z09 SURGERY FOLLOW-UP: Primary | ICD-10-CM

## 2023-11-17 ENCOUNTER — HOSPITAL ENCOUNTER (OUTPATIENT)
Dept: GENERAL RADIOLOGY | Facility: HOSPITAL | Age: 33
Discharge: HOME | End: 2023-11-17
Admitting: STUDENT IN AN ORGANIZED HEALTH CARE EDUCATION/TRAINING PROGRAM
Payer: COMMERCIAL

## 2023-11-17 ENCOUNTER — OFFICE VISIT (OUTPATIENT)
Age: 33
End: 2023-11-17
Payer: COMMERCIAL

## 2023-11-17 VITALS — TEMPERATURE: 98.3 F

## 2023-11-17 DIAGNOSIS — S62.022A DISPLACED FRACTURE OF MIDDLE THIRD OF NAVICULAR (SCAPHOID) BONE OF LEFT WRIST, INITIAL ENCOUNTER FOR CLOSED FRACTURE: Primary | ICD-10-CM

## 2023-11-17 DIAGNOSIS — Z09 SURGERY FOLLOW-UP: ICD-10-CM

## 2023-11-17 PROCEDURE — 99024 POSTOP FOLLOW-UP VISIT: CPT | Performed by: STUDENT IN AN ORGANIZED HEALTH CARE EDUCATION/TRAINING PROGRAM

## 2023-11-17 PROCEDURE — 73110 X-RAY EXAM OF WRIST: CPT

## 2023-11-17 NOTE — PROGRESS NOTES
[facility]     Post Operative Office Visit      Date: 11/17/2023   Patient Name: Hammad Pressley  MRN: [id number]  YOB: 1990    Chief Complaint:   Chief Complaint   Patient presents with    Post-op     2 weeks status post Left scaphoid open reduction internal fixation - 11/1/23     History of Present Illness:   Hammad Pressley is a 33 y.o. male status post left scaphoid ORIF, DOS 11/1/2023.  Patient reports doing well at home.  For the first week after surgery he had significant pain, however this is improved with time and has not required any medications for the past 1 week.  He denies numbness or tingling.  He has maintained his operative splint without difficulty.    Subjective   Review of Systems:   Review of Systems   Constitutional:  Negative for chills, fever, unexpected weight gain and unexpected weight loss.   HENT:  Negative for congestion, postnasal drip and rhinorrhea.    Eyes:  Negative for blurred vision.   Respiratory:  Negative for shortness of breath.    Cardiovascular:  Negative for leg swelling.   Gastrointestinal:  Negative for abdominal pain, nausea and vomiting.   Genitourinary:  Negative for difficulty urinating.   Musculoskeletal:  Positive for arthralgias. Negative for gait problem, joint swelling and myalgias.   Skin:  Negative for skin lesions and wound.   Neurological:  Negative for dizziness, weakness, light-headedness and numbness.   Hematological:  Does not bruise/bleed easily.   Psychiatric/Behavioral:  Negative for depressed mood.    All other systems reviewed and are negative.     I reviewed the patient's chief complaint, history of present illness, review of systems, past medical history, surgical history, family history, social history, medications and allergy list in the EMR on 11/17/2023 and agree with the findings above.    Objective    Vital Signs:   Vitals:    11/17/23 1043   Temp:  98.3 °F (36.8 °C)     General Appearance: No acute distress. Alert and oriented.     Ortho Exam:  Examination of left upper extremity demonstrates a dorsal surgical incision with Steri-Strips in place.  No evidence of wound dehiscence, drainage, or erythema.  There is mild swelling and no ecchymosis.  He is appropriately tender along the surgical incision.  Some pain noted with wrist range of motion.  Sensation is grossly intact throughout the hand.  Warm and well-perfused distally.    Imaging / Studies:    Imaging Results (Last 24 Hours)       ** No results found for the last 24 hours. **        Left wrist x-rays obtained on 11/17/2023 personally reviewed.  These demonstrate postsurgical changes scaphoid.  Maintained position and alignment of the screw as compared to intraoperative imaging.    Assessment / Plan    Assessment/Plan:   Hammad Pressley is a 33 y.o. male status post left scaphoid ORIF, DOS 11/1/2023.    I will transition the patient into a short arm fiberglass cast today for 4 weeks.  He may use his hand and digits as allowed by the cast.  Recommended no heavy lifting.  Cast care instructions were provided.  He will return to clinic with repeat x-rays out of the cast.  I will reexamine him and based on his exam and radiographs I may consider a removable splint at that time.  The patient expressed understanding of the plan.  All questions and concerns were addressed.      ICD-10-CM ICD-9-CM   1. Displaced fracture of middle third of navicular (scaphoid) bone of left wrist, initial encounter for closed fracture  S62.022A 814.01     Follow Up:   Return in about 4 weeks (around 12/15/2023) for Follow Up with xray out of cast.      Brianne Napoles MD  [facility] & Hand Surgeon

## 2023-12-05 DIAGNOSIS — Z09 SURGERY FOLLOW-UP: Primary | ICD-10-CM

## 2023-12-15 ENCOUNTER — TREATMENT (OUTPATIENT)
Dept: PHYSICAL THERAPY | Facility: CLINIC | Age: 33
End: 2023-12-15
Payer: COMMERCIAL

## 2023-12-15 ENCOUNTER — HOSPITAL ENCOUNTER (OUTPATIENT)
Dept: GENERAL RADIOLOGY | Facility: HOSPITAL | Age: 33
Discharge: HOME | End: 2023-12-15
Admitting: STUDENT IN AN ORGANIZED HEALTH CARE EDUCATION/TRAINING PROGRAM
Payer: COMMERCIAL

## 2023-12-15 ENCOUNTER — OFFICE VISIT (OUTPATIENT)
Age: 33
End: 2023-12-15
Payer: COMMERCIAL

## 2023-12-15 DIAGNOSIS — S62.022A DISPLACED FRACTURE OF MIDDLE THIRD OF NAVICULAR (SCAPHOID) BONE OF LEFT WRIST, INITIAL ENCOUNTER FOR CLOSED FRACTURE: Primary | ICD-10-CM

## 2023-12-15 DIAGNOSIS — Z09 SURGERY FOLLOW-UP: ICD-10-CM

## 2023-12-15 PROCEDURE — 99024 POSTOP FOLLOW-UP VISIT: CPT | Performed by: STUDENT IN AN ORGANIZED HEALTH CARE EDUCATION/TRAINING PROGRAM

## 2023-12-15 PROCEDURE — 73110 X-RAY EXAM OF WRIST: CPT

## 2023-12-15 NOTE — PROGRESS NOTES
[facility] Orthopedic     Post Operative Office Visit      Date: 12/15/2023   Patient Name: Hammad Pressley  MRN: [id number]  YOB: 1990    Chief Complaint:   Chief Complaint   Patient presents with    Post-op     4 week follow up; 6 weeks status post Left scaphoid open reduction internal fixation - 11/1/23     History of Present Illness:   Hammad Pressley is a 33 y.o. male status post left scaphoid ORIF, DOS 11/1/2023.  Patient reports doing well since his last clinic visit.  He is maintained his short arm cast without difficulty.  No pain.  No difficulty with cast wear.  No other complaints or concerns.    Subjective   Review of Systems:   Review of Systems   Constitutional: Negative.    HENT: Negative.     Eyes: Negative.    Respiratory: Negative.     Cardiovascular: Negative.    Gastrointestinal: Negative.    Endocrine: Negative.    Genitourinary: Negative.    Musculoskeletal:  Positive for arthralgias.   Skin: Negative.    Allergic/Immunologic: Negative.    Neurological: Negative.    Hematological: Negative.    Psychiatric/Behavioral: Negative.          I reviewed the patient's chief complaint, history of present illness, review of systems, past medical history, surgical history, family history, social history, medications and allergy list in the EMR on 12/15/2023 and agree with the findings above.    Objective    Vital Signs: There were no vitals filed for this visit.    General Appearance: No acute distress. Alert and oriented.     Ortho Exam:  Examination of the left upper extremity with the short arm cast removed demonstrates a well-healed dorsal surgical incision.  This is nontender to palpation.  He is nontender along the dorsal SL interval and anatomic snuffbox.  Wrist range of motion is limited.  He will make a composite fist.  Sensation intact to light touch throughout the hand.  Warm and well-perfused distally.    Imaging  / Studies:    Imaging Results (Last 24 Hours)       ** No results found for the last 24 hours. **        Left wrist x-rays obtained on 12/15/2020 and 3 were personally reviewed and interpreted by myself.  These demonstrate retained hardware within the scaphoid without interval change in position as compared to prior films.  Subtle interval healing noted.    Assessment / Plan    Assessment/Plan:   Hammad Pressley is a 33 y.o. male status post left scaphoid ORIF, DOS 11/1/2023.    Patient will be transitioned out of his short arm cast today into a custom removable thumb spica splint.  Splint is to be worn at all times coming out only for hygiene and range of motion exercises.  He may begin wrist and thumb range of motion.  He can advance to strengthening once range of motion is fully achieved.  May return to work but cautioned him on avoiding heavy push pull or lifting.  Patient expressed understanding.  I will see him back in 6 weeks for reevaluation with x-rays.      ICD-10-CM ICD-9-CM   1. Displaced fracture of middle third of navicular (scaphoid) bone of left wrist, initial encounter for closed fracture  S62.022A 814.01     Follow Up:   Return in about 6 weeks (around 1/26/2024) for Follow Up- with repeat xrays.      Brianne Napoles MD  [facility] & Hand Surgeon

## 2023-12-15 NOTE — LETTER
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    Patient was instructed in wearing schedule, precautions and care of the splint during this visit.   Patient was instructed in proper donning/doffing of splint.   Assessment:  Patient was fitted and appropriate splint was fabricated this date.  Patient reported that splint was comfortable and had no complications with the fit of the splint.  Patient was instructed and patient verbalized understanding of precautions, wear and care of the splint.   Patient demonstrated independent donning/doffing of splint during treatment today.  Goals:  Patient was fitted properly with appropriate splint for diagnosis  Patient was educated on precautions, wear schedule and care of splint  Patient demonstrated independence with donning/doffing of the splint.  Splint was provided to Protect Healing Structures, Restrict Mobility, Improve joint alignment.  Plan:  No additional treatment is required for this patient at this time. The patient is therefore discharged from therapy.  Patient advised to contact therapist with any additional questions or concerns regarding the fit and function of the splint.  Patient will be seen for splint issues as needed   Wear Instructions: Off for " hygiene       PT SIGNATURE: ANAI Stanley, PT, DPT  License Number: [id number]  Electronically signed by ANAI Stanley, PT, 12/19/23, 10:17 AM EST    PHYSICIAN: Brianne Napoles MD      DATE:     Please sign and return via fax to  .. Thank you, [facility].

## 2023-12-18 ENCOUNTER — TELEPHONE (OUTPATIENT)
Dept: FAMILY MEDICINE CLINIC | Facility: CLINIC | Age: 33
End: 2023-12-18
Payer: COMMERCIAL

## 2023-12-18 RX ORDER — GLYCOPYRROLATE 1 MG/1
1 TABLET ORAL 2 TIMES DAILY
Qty: 60 TABLET | Refills: 1 | Status: SHIPPED | OUTPATIENT
Start: 2023-12-18

## 2023-12-18 NOTE — TELEPHONE ENCOUNTER
----- Message from Hammad Pressley sent at 12/18/2023 10:57 AM EST -----  Regarding: Refill  Contact: [phone]  Need a refill for Glycopyrrolate

## 2023-12-19 NOTE — PROGRESS NOTES
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    Patient was instructed in wearing schedule, precautions and care of the splint during this visit.   Patient was instructed in proper donning/doffing of splint.   Assessment:  Patient was fitted and appropriate splint was fabricated this date.  Patient reported that splint was comfortable and had no complications with the fit of the splint.  Patient was instructed and patient verbalized understanding of precautions, wear and care of the splint.   Patient demonstrated independent donning/doffing of splint during treatment today.  Goals:  Patient was fitted properly with appropriate splint for diagnosis  Patient was educated on precautions, wear schedule and care of splint  Patient demonstrated independence with donning/doffing of the splint.  Splint was provided to Protect Healing Structures, Restrict Mobility, Improve joint alignment.  Plan:  No additional treatment is required for this patient at this time. The patient is therefore discharged from therapy.  Patient advised to contact therapist with any additional questions or concerns regarding the fit and function of the splint.  Patient will be seen for splint issues as needed   Wear Instructions: Off for " hygiene       PT SIGNATURE: ANAI Stanley, PT, DPT  License Number: [id number]  Electronically signed by ANAI Stanley, PT, 12/19/23, 10:17 AM EST    PHYSICIAN: Brianne Napoles MD      DATE:     Please sign and return via fax to  .. Thank you, [facility].

## 2024-01-12 DIAGNOSIS — Z09 SURGERY FOLLOW-UP: Primary | ICD-10-CM

## 2024-01-30 ENCOUNTER — OFFICE VISIT (OUTPATIENT)
Age: 34
End: 2024-01-30
Payer: COMMERCIAL

## 2024-01-30 ENCOUNTER — HOSPITAL ENCOUNTER (OUTPATIENT)
Dept: GENERAL RADIOLOGY | Facility: HOSPITAL | Age: 34
Discharge: HOME | End: 2024-01-30
Admitting: STUDENT IN AN ORGANIZED HEALTH CARE EDUCATION/TRAINING PROGRAM
Payer: COMMERCIAL

## 2024-01-30 DIAGNOSIS — Z09 SURGERY FOLLOW-UP: ICD-10-CM

## 2024-01-30 DIAGNOSIS — Z09 SURGERY FOLLOW-UP: Primary | ICD-10-CM

## 2024-01-30 PROCEDURE — 99024 POSTOP FOLLOW-UP VISIT: CPT | Performed by: STUDENT IN AN ORGANIZED HEALTH CARE EDUCATION/TRAINING PROGRAM

## 2024-01-30 PROCEDURE — 73110 X-RAY EXAM OF WRIST: CPT

## 2024-01-30 NOTE — PROGRESS NOTES
[facility]     Post Operative Office Visit      Date: 01/30/2024   Patient Name: Hammad Pressley  MRN: [id number]  YOB: 1990    Chief Complaint:   Chief Complaint   Patient presents with    Post-op     6 week follow up -- 12 weeks status post Left scaphoid open reduction internal fixation - 11/1/23     History of Present Illness:   Hammad Pressley is a 33 y.o. male status post left scaphoid ORIF, DOS 11/1/2023.  Patient is been doing well since his last clinic visit.  He has been wearing his custom thermoplastic splint intermittently.  He has returned to work.  He continues to endorse stiffness to the wrist that is worse when he is attempting to push off with the wrist.  Otherwise no pain at rest.  No other complaints or concerns.    Subjective   Review of Systems:   Review of Systems   Constitutional: Negative.    HENT: Negative.     Eyes: Negative.    Respiratory: Negative.     Cardiovascular: Negative.    Gastrointestinal: Negative.    Endocrine: Negative.    Genitourinary: Negative.    Musculoskeletal:  Positive for arthralgias.   Skin: Negative.    Allergic/Immunologic: Negative.    Neurological: Negative.    Hematological: Negative.    Psychiatric/Behavioral: Negative.          I reviewed the patient's chief complaint, history of present illness, review of systems, past medical history, surgical history, family history, social history, medications and allergy list in the EMR on 01/30/2024 and agree with the findings above.    Objective    Vital Signs: There were no vitals filed for this visit.    General Appearance: No acute distress. Alert and oriented.     Ortho Exam:  Examination of left upper extremity demonstrates a well-healed dorsal incision.  He is nontender along the anatomic snuffbox and dorsal SL interval.  Nontender to axial loading of the scaphoid.  Patient is restricted wrist range of motion with 20  degrees of extension and 20 degrees of flexion.  He has full pronation and supination.  He can make a composite fist.  Sensation intact to light touch throughout the hand.  Warm and well-perfused distally.    Imaging / Studies:    Imaging Results (Last 24 Hours)       ** No results found for the last 24 hours. **        Wrist x-rays obtained on 1/30/2024 personally reviewed and interpreted by myself.  These demonstrate retained hardware in the scaphoid without interval change in positioning or alignment from prior films.  No obvious fracture line noted.    Assessment / Plan    Assessment/Plan:   Hammad Pressley is a 33 y.o. male status post post left scaphoid ORIF, DOS 1/1/2023.    The patient has returned to work and has been intermittently using his splint. His pain is mostly secondary to wrist stiffness as his range of motion is significantly limited on exam.  He has been performing home directed exercises but was agreeable to formal physical therapy.  A prescription was provided.  I would like him to discontinue the splint as he tolerates.  The patient expressed understanding.  I will see him back in 2 months for reevaluation of his motion.  The patient was agreeable with the plan all questions and concerns were addressed.      ICD-10-CM ICD-9-CM   1. Surgery follow-up  Z09 V67.00     Follow Up:   Return in about 2 months (around 3/30/2024) for Follow Up.      Brianne Napoles MD  [facility]

## 2024-02-21 ENCOUNTER — TELEPHONE (OUTPATIENT)
Dept: FAMILY MEDICINE CLINIC | Facility: CLINIC | Age: 34
End: 2024-02-21
Payer: COMMERCIAL

## 2024-02-21 DIAGNOSIS — F41.9 ANXIETY: ICD-10-CM

## 2024-02-21 RX ORDER — GLYCOPYRROLATE 1 MG/1
1 TABLET ORAL 2 TIMES DAILY
Qty: 60 TABLET | Refills: 1 | Status: SHIPPED | OUTPATIENT
Start: 2024-02-21

## 2024-02-21 RX ORDER — BUPROPION HYDROCHLORIDE 300 MG/1
300 TABLET ORAL DAILY
Qty: 90 TABLET | Refills: 0 | Status: SHIPPED | OUTPATIENT
Start: 2024-02-21

## 2024-04-02 ENCOUNTER — OFFICE VISIT (OUTPATIENT)
Age: 34
End: 2024-04-02
Payer: COMMERCIAL

## 2024-04-02 VITALS
DIASTOLIC BLOOD PRESSURE: 96 MMHG | HEIGHT: 73 IN | BODY MASS INDEX: 29.65 KG/M2 | SYSTOLIC BLOOD PRESSURE: 128 MMHG | WEIGHT: 223.7 LBS

## 2024-04-02 DIAGNOSIS — S62.022A DISPLACED FRACTURE OF MIDDLE THIRD OF NAVICULAR (SCAPHOID) BONE OF LEFT WRIST, INITIAL ENCOUNTER FOR CLOSED FRACTURE: Primary | ICD-10-CM

## 2024-04-02 PROCEDURE — 99213 OFFICE O/P EST LOW 20 MIN: CPT | Performed by: STUDENT IN AN ORGANIZED HEALTH CARE EDUCATION/TRAINING PROGRAM

## 2024-05-21 ENCOUNTER — TELEPHONE (OUTPATIENT)
Dept: FAMILY MEDICINE CLINIC | Facility: CLINIC | Age: 34
End: 2024-05-21
Payer: COMMERCIAL

## 2024-05-21 RX ORDER — GLYCOPYRROLATE 1 MG/1
1 TABLET ORAL 2 TIMES DAILY
Qty: 180 TABLET | Refills: 1 | Status: SHIPPED | OUTPATIENT
Start: 2024-05-21

## 2024-06-13 ENCOUNTER — TELEPHONE (OUTPATIENT)
Dept: FAMILY MEDICINE CLINIC | Facility: CLINIC | Age: 34
End: 2024-06-13
Payer: COMMERCIAL

## 2024-06-13 DIAGNOSIS — F41.9 ANXIETY: ICD-10-CM

## 2024-06-13 RX ORDER — BUPROPION HYDROCHLORIDE 300 MG/1
300 TABLET ORAL DAILY
Qty: 90 TABLET | Refills: 1 | Status: SHIPPED | OUTPATIENT
Start: 2024-06-13

## 2024-06-13 NOTE — TELEPHONE ENCOUNTER
----- Message from [employer] sent at 6/13/2024 11:15 AM EDT -----  Regarding: Refill  Contact: [phone]  I need a refill for bupropion

## 2024-08-05 ENCOUNTER — OFFICE VISIT (OUTPATIENT)
Dept: FAMILY MEDICINE CLINIC | Facility: CLINIC | Age: 34
End: 2024-08-05
Payer: COMMERCIAL

## 2024-08-05 VITALS
TEMPERATURE: 98.2 F | SYSTOLIC BLOOD PRESSURE: 124 MMHG | HEIGHT: 73 IN | WEIGHT: 220 LBS | DIASTOLIC BLOOD PRESSURE: 84 MMHG | BODY MASS INDEX: 29.16 KG/M2 | HEART RATE: 77 BPM | OXYGEN SATURATION: 98 % | RESPIRATION RATE: 18 BRPM

## 2024-08-05 DIAGNOSIS — I83.891 VARICOSE VEINS OF LEG WITH EDEMA, RIGHT: ICD-10-CM

## 2024-08-05 DIAGNOSIS — L74.510 HYPERHIDROSIS OF AXILLA: ICD-10-CM

## 2024-08-05 DIAGNOSIS — F41.9 ANXIETY: Primary | ICD-10-CM

## 2024-08-05 DIAGNOSIS — L20.82 FLEXURAL ECZEMA: ICD-10-CM

## 2024-08-05 PROCEDURE — 99214 OFFICE O/P EST MOD 30 MIN: CPT | Performed by: NURSE PRACTITIONER

## 2024-08-05 RX ORDER — TRIAMCINOLONE ACETONIDE 1 MG/G
1 OINTMENT TOPICAL 2 TIMES DAILY
Qty: 30 G | Refills: 0 | Status: SHIPPED | OUTPATIENT
Start: 2024-08-05

## 2024-08-05 NOTE — PROGRESS NOTES
"    Office Note     Name: Hammad Pressley    : 1990     MRN: [id number]     Chief Complaint  patient states that medicine is not working     Subjective     History of Present Illness:  Hammad Pressley is a 34 y.o. male who presents today for follow-up on chronic conditions including eczema, hyperhidrosis and anxiety.   His blood pressure is well-controlled in office today, 124/84.  Patient was initiated on Wellbutrin approximately 10 months ago for anxiety that developed after being stuck at home on work leave due to a back injury.  He stated while at home and sedentary he became very agitated and irritable.  Unfortunately due to some increased stressors at work he feels that the 300 mg daily Wellbutrin he takes is no longer controlling his symptoms.  He states that he is very irritable and agitated at work, has found that he comes home and takes his frustrations out on family members in some capacity.  He has no further complaints or concerns today    Review of Systems   Respiratory:  Negative for shortness of breath.    Cardiovascular:  Negative for chest pain and palpitations.   Gastrointestinal:  Negative for nausea.   Neurological:  Negative for dizziness and confusion.   Psychiatric/Behavioral:  Positive for agitation and stress. Negative for depressed mood.        Objective     Past Medical History:   Diagnosis Date    Anxiety     Fracture of wrist 10/12/23    Hyperhidrosis      Past Surgical History:   Procedure Laterality Date    BACK SURGERY      SHOULDER SURGERY Right     Rotator cuff repair & dislocation repair    WRIST SURGERY Left 2023    Left scaphoid open reduction internal fixation -Dr. Napoles     History reviewed. No pertinent family history.    Vital Signs  /84 (BP Location: Left arm, Patient Position: Sitting, Cuff Size: Adult)   Pulse 77   Temp 98.2 °F (36.8 °C) (Temporal)   Resp 18   Ht 185.4 cm (73\")   Wt 99.8 kg (220 lb)   SpO2 98%   BMI 29.03 kg/m²   Estimated " "body mass index is 29.03 kg/m² as calculated from the following:    Height as of this encounter: 185.4 cm (73\").    Weight as of this encounter: 99.8 kg (220 lb).  Facility age limit for growth %joceline is 20 years.    Physical Exam  Vitals reviewed.   Constitutional:       Appearance: Normal appearance.   HENT:      Right Ear: Tympanic membrane, ear canal and external ear normal.      Left Ear: Tympanic membrane, ear canal and external ear normal.      Nose: Nose normal.      Mouth/Throat:      Mouth: Mucous membranes are moist.      Pharynx: Oropharynx is clear.   Cardiovascular:      Rate and Rhythm: Normal rate and regular rhythm.      Pulses: Normal pulses.      Heart sounds: Normal heart sounds.   Pulmonary:      Effort: Pulmonary effort is normal.      Breath sounds: Normal breath sounds.   Musculoskeletal:         General: Normal range of motion.      Cervical back: Neck supple.   Skin:     General: Skin is warm and dry.      Capillary Refill: Capillary refill takes less than 2 seconds.   Neurological:      Mental Status: He is alert and oriented to person, place, and time.             POCT Results (if applicable):  Results for orders placed or performed in visit on 07/03/23   TSH Rfx On Abnormal To Free T4    Specimen: Arm, Left; Blood    Blood  Release to ever   Result Value Ref Range    TSH 1.350 0.450 - 4.500 uIU/mL   Lipid Panel    Specimen: Arm, Left; Blood    Blood  Release to ever   Result Value Ref Range    Total Cholesterol 158 100 - 199 mg/dL    Triglycerides 171 (H) 0 - 149 mg/dL    HDL Cholesterol 36 (L) >39 mg/dL    VLDL Cholesterol Bryce 30 5 - 40 mg/dL    LDL Chol Calc (NIH) 92 0 - 99 mg/dL   Comprehensive Metabolic Panel    Specimen: Arm, Left; Blood    Blood  Release to ever   Result Value Ref Range    Glucose 87 70 - 99 mg/dL    BUN 10 6 - 20 mg/dL    Creatinine 1.13 0.76 - 1.27 mg/dL    EGFR Result 88 >59 mL/min/1.73    BUN/Creatinine Ratio 9 9 - 20    Sodium 139 134 - 144 mmol/L    Potassium " 4.2 3.5 - 5.2 mmol/L    Chloride 100 96 - 106 mmol/L    Total CO2 25 20 - 29 mmol/L    Calcium 9.9 8.7 - 10.2 mg/dL    Total Protein 7.5 6.0 - 8.5 g/dL    Albumin 4.8 4.0 - 5.0 g/dL    Globulin 2.7 1.5 - 4.5 g/dL    A/G Ratio 1.8 1.2 - 2.2    Total Bilirubin 1.7 (H) 0.0 - 1.2 mg/dL    Alkaline Phosphatase 74 44 - 121 IU/L    AST (SGOT) 27 0 - 40 IU/L    ALT (SGPT) 42 0 - 44 IU/L   Hepatitis C Antibody    Specimen: Arm, Left; Blood    Blood  Release to ever   Result Value Ref Range    Hep C Virus Ab Non Reactive Non Reactive            Assessment and Plan     Diagnoses and all orders for this visit:    1. Anxiety (Primary)  Assessment & Plan:  Patient was initiated on Wellbutrin approximately 10 months ago for anxiety that developed after being stuck at home on work leave due to a back injury.  He stated while at home and sedentary he became very agitated and irritable.  Unfortunately due to some increased stressors at work he feels that the 300 mg daily Wellbutrin he takes is no longer controlling his symptoms.  He states that he is very irritable and agitated at work, has found that he comes home and takes his frustrations out on family members in some capacity.  -Will continue wellbutrin XL 300mg daily  -Add vraylar 1.5mg daily  -Follow-up in two weeks to evaluate efficacy      2. Flexural eczema  Assessment & Plan:  Patient with longstanding diagnosis of eczema, flares worse and extremely cold or extremely hot weather.  Patient currently has lesion on his right lower extremity that has not responded to over-the-counter creams or lotions.  Will provide prescription for triamcinolone cream in office today    Orders:  -     triamcinolone (KENALOG) 0.1 % ointment; Apply 1 Application topically to the appropriate area as directed 2 (Two) Times a Day.  Dispense: 30 g; Refill: 0    3. Varicose veins of leg with edema, right  Assessment & Plan:   Patient also has new complaint of varicose veins in his right leg.  Patient  states the veins are not painful but he will at times noticed there is a bit of swelling at the end of a long day at work.  We discussed conservative measures including wearing compression stockings and elevating feet when possible.  We also discussed referral to vascular which he declines at this time      4. Hyperhidrosis of axilla  Assessment & Plan:  Patient has longstanding excessive sweating of the armpits. He has tried different antiperspirants and deodorants without benefit. He reported excessive sweating even when in a swimming pool. He has been taking glycopyrrolate 1 mg twice daily for the last several months with significant benefit              Follow Up  Return in about 6 weeks (around 9/16/2024) for Annual physical.    Aylin Monique, APRN

## 2024-08-14 NOTE — ASSESSMENT & PLAN NOTE
Patient also has new complaint of varicose veins in his right leg.  Patient states the veins are not painful but he will at times noticed there is a bit of swelling at the end of a long day at work.  We discussed conservative measures including wearing compression stockings and elevating feet when possible.  We also discussed referral to vascular which he declines at this time

## 2024-08-14 NOTE — ASSESSMENT & PLAN NOTE
Patient was initiated on Wellbutrin approximately 10 months ago for anxiety that developed after being stuck at home on work leave due to a back injury.  He stated while at home and sedentary he became very agitated and irritable.  Unfortunately due to some increased stressors at work he feels that the 300 mg daily Wellbutrin he takes is no longer controlling his symptoms.  He states that he is very irritable and agitated at work, has found that he comes home and takes his frustrations out on family members in some capacity.  -Will continue wellbutrin XL 300mg daily  -Add vraylar 1.5mg daily  -Follow-up in two weeks to evaluate efficacy

## 2024-08-14 NOTE — ASSESSMENT & PLAN NOTE
Patient with longstanding diagnosis of eczema, flares worse and extremely cold or extremely hot weather.  Patient currently has lesion on his right lower extremity that has not responded to over-the-counter creams or lotions.  Will provide prescription for triamcinolone cream in office today

## 2024-09-04 ENCOUNTER — TELEPHONE (OUTPATIENT)
Dept: FAMILY MEDICINE CLINIC | Facility: CLINIC | Age: 34
End: 2024-09-04
Payer: COMMERCIAL

## 2024-09-04 NOTE — TELEPHONE ENCOUNTER
----- Message from [facility] [email or web address] sent at 9/4/2024  5:14 PM EDT -----  Regarding: Anxiety Medication  Contact: [phone]  I feel that taking the vraylar with the wellbutrin has helped significantly

## 2024-09-06 ENCOUNTER — TELEPHONE (OUTPATIENT)
Age: 34
End: 2024-09-06
Payer: COMMERCIAL

## 2024-09-06 NOTE — TELEPHONE ENCOUNTER
Called to reschedule his 10/2/24 appointment to 10/1/24. Dr Napolse will be out of the [facility].    HUB ok to reschedule

## 2024-10-01 ENCOUNTER — OFFICE VISIT (OUTPATIENT)
Age: 34
End: 2024-10-01
Payer: COMMERCIAL

## 2024-10-01 VITALS
DIASTOLIC BLOOD PRESSURE: 90 MMHG | BODY MASS INDEX: 30.27 KG/M2 | WEIGHT: 228.4 LBS | SYSTOLIC BLOOD PRESSURE: 132 MMHG | HEIGHT: 73 IN

## 2024-10-01 DIAGNOSIS — S62.022A DISPLACED FRACTURE OF MIDDLE THIRD OF NAVICULAR (SCAPHOID) BONE OF LEFT WRIST, INITIAL ENCOUNTER FOR CLOSED FRACTURE: ICD-10-CM

## 2024-10-01 DIAGNOSIS — Z09 SURGERY FOLLOW-UP: Primary | ICD-10-CM

## 2024-10-01 NOTE — PROGRESS NOTES
[facility]     Office Visit       Date: 10/01/2024   Patient Name: Hammad Pressley  MRN: [id number]  YOB: 1990    Referring Physician: No ref. provider found     Chief Complaint:   Chief Complaint   Patient presents with    Follow-up     6 month follow up -- status post Left scaphoid open reduction internal fixation - 11/1/23     History of Present Illness:   Hammad Pressley is a 34 y.o. male status post left scaphoid ORIF, DOS 11/1/2023.  Patient reports doing well since his last clinic visit.  He has resumed all of his activities without restriction.  He denies any pain.  No reinjury.  No other complaints or concerns.    Subjective   Review of Systems:   Review of Systems   Constitutional:  Negative for chills, fever, unexpected weight gain and unexpected weight loss.   HENT:  Negative for congestion, postnasal drip and rhinorrhea.    Eyes:  Negative for blurred vision.   Respiratory:  Negative for shortness of breath.    Cardiovascular:  Negative for leg swelling.   Gastrointestinal:  Negative for abdominal pain, nausea and vomiting.   Genitourinary:  Negative for difficulty urinating.   Musculoskeletal:  Positive for arthralgias. Negative for gait problem, joint swelling and myalgias.   Skin:  Negative for skin lesions and wound.   Neurological:  Negative for dizziness, weakness, light-headedness and numbness.   Hematological:  Does not bruise/bleed easily.   Psychiatric/Behavioral:  Negative for depressed mood.       Pertinent review of systems per HPI    I reviewed the patient's chief complaint, history of present illness, review of systems, past medical history, surgical history, family history, social history, medications and allergy list in the EMR on 10/01/2024 and agree with the findings above.    Objective    Vital Signs:   Vitals:    10/01/24 1437   BP: 132/90   Weight: 104 kg (228 lb 6.4 oz)   Height: 185.4 cm  "(72.99\")     BMI:      General: No acute distress. Alert and oriented.   Cardiovascular: Palpable radial pulse.   Respiratory: Breathing is nonlabored.     Ortho Exam:  Examination of the left upper extremity demonstrates a well-healed surgical incision.  No deformity.  No swelling or ecchymosis.  He is nontender over the dorsal aspect of the SL interval, anatomic snuffbox, or distal scaphoid.  He has 55 degrees of wrist extension.  He is able to make composite fist.  Sensation is intact to light touch of the hand.  Warm well-perfused distally.    Imaging / Studies:    Imaging Results (Last 24 Hours)       Procedure Component Value Units Date/Time    XR Wrist 3+ View Left [[id number]] Resulted: 10/01/24 1453     Updated: 10/01/24 1455    Narrative:      Left Wrist X-Ray    Indication: Pain    Views:  PA, Lateral, scaphoid and Oblique     Comparison:  1/30/24    Findings:  Status post scaphoid open reduction internal fixation.  No interval change   in alignment from prior imaging.  Healing noted at the fracture.          Assessment / Plan    Assessment/Plan:   Hammad Pressley is a 34 y.o. male status post left scaphoid ORIF, DOS 11/1/2023.     Patient has done well postoperatively.  He has resumed all his activities and has painless motion.  He may follow-up with me as needed if symptoms worsen or fail to improve.  All questions and concerns were addressed.  He was agreeable.      ICD-10-CM ICD-9-CM   1. Surgery follow-up  Z09 V67.00   2. Displaced fracture of middle third of navicular (scaphoid) bone of left wrist, initial encounter for closed fracture  S62.022A 814.01     Follow Up:   Return if symptoms worsen or fail to improve.      Brianne Napoles MD  [facility]  "

## 2024-10-30 ENCOUNTER — OFFICE VISIT (OUTPATIENT)
Dept: FAMILY MEDICINE CLINIC | Facility: CLINIC | Age: 34
End: 2024-10-30
Payer: COMMERCIAL

## 2024-10-30 VITALS
OXYGEN SATURATION: 98 % | HEART RATE: 74 BPM | RESPIRATION RATE: 16 BRPM | HEIGHT: 73 IN | WEIGHT: 227 LBS | SYSTOLIC BLOOD PRESSURE: 136 MMHG | TEMPERATURE: 98.3 F | BODY MASS INDEX: 30.09 KG/M2 | DIASTOLIC BLOOD PRESSURE: 84 MMHG

## 2024-10-30 DIAGNOSIS — F41.9 ANXIETY: Primary | ICD-10-CM

## 2024-10-30 DIAGNOSIS — G89.29 CHRONIC RIGHT-SIDED LOW BACK PAIN WITHOUT SCIATICA: ICD-10-CM

## 2024-10-30 DIAGNOSIS — M54.50 CHRONIC RIGHT-SIDED LOW BACK PAIN WITHOUT SCIATICA: ICD-10-CM

## 2024-10-30 DIAGNOSIS — Z98.890 S/P DISCECTOMY: ICD-10-CM

## 2024-10-30 PROCEDURE — 99214 OFFICE O/P EST MOD 30 MIN: CPT | Performed by: NURSE PRACTITIONER

## 2024-10-30 RX ORDER — PREDNISONE 20 MG/1
40 TABLET ORAL DAILY
Qty: 14 TABLET | Refills: 0 | Status: SHIPPED | OUTPATIENT
Start: 2024-10-30 | End: 2024-11-06

## 2024-10-30 RX ORDER — CYCLOBENZAPRINE HCL 5 MG
5 TABLET ORAL 3 TIMES DAILY PRN
Qty: 15 TABLET | Refills: 0 | Status: SHIPPED | OUTPATIENT
Start: 2024-10-30

## 2024-10-30 NOTE — PROGRESS NOTES
Office Note     Name: Hammad Pressley    : 1990     MRN: [id number]     Chief Complaint  low right side back pain, hurting for a couple of days, no     Subjective     History of Present Illness:  Hammad Pressley is a 34 y.o. male who presents today for a couple of days of low right sided back pain. He has taken naproxen 500mg with some relief. Worse upon awakening in the morning. Pain also significant when moving from sitting to standing position. He has not utilized heat or cold compress.  Patient has longstanding pattern of low back pain, having undergone L5-S1 discectomy in . 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 For the last year patient has been taking Wellbutrin for anxiety that developed after being stuck at home a work leave due to a previous back injury.  He stated while at home and sedentary he became very agitated and irritable.  During his visit 2 months ago he was experiencing some increase stressors at work feeling that his 300 mg daily Wellbutrin dosing was no longer controlling his symptoms.  He had become very agitated and irritable at work and was taking his frustration out on his family when returning home.  He was initiated on Vraylar 1.5 mg daily in addition to his bupropion 300 mg dosing.  Initially patient received excellent benefit, however over the last week or 2 the efficacy is waned. He has no further complaints or concerns today     Review of Systems   Constitutional:  Negative for chills, fatigue and fever.   Respiratory:  Negative for cough and shortness of breath.    Cardiovascular:  Negative for chest pain, palpitations and leg swelling.   Gastrointestinal:  Negative for constipation, diarrhea and nausea.   Genitourinary:  Negative for decreased urine volume, urgency and urinary incontinence.   Musculoskeletal:  Positive for arthralgias and back pain. Negative for  "joint swelling.   Neurological:  Negative for weakness, numbness and headache.       Objective     Past Medical History:   Diagnosis Date    Anxiety     Fracture of wrist 10/12/23    Hyperhidrosis      Past Surgical History:   Procedure Laterality Date    BACK SURGERY      FRACTURE SURGERY      SHOULDER SURGERY Right     Rotator cuff repair & dislocation repair    TONSILLECTOMY      WRIST SURGERY Left 11/01/2023    Left scaphoid open reduction internal fixation -Dr. Napoles     History reviewed. No pertinent family history.    Vital Signs  /84 (BP Location: Left arm, Patient Position: Sitting, Cuff Size: Adult)   Pulse 74   Temp 98.3 °F (36.8 °C) (Temporal)   Resp 16   Ht 185.4 cm (73\")   Wt 103 kg (227 lb)   SpO2 98%   BMI 29.95 kg/m²   Estimated body mass index is 29.95 kg/m² as calculated from the following:    Height as of this encounter: 185.4 cm (73\").    Weight as of this encounter: 103 kg (227 lb).  Facility age limit for growth %joceline is 20 years.    Physical Exam  Vitals reviewed.   Constitutional:       Appearance: Normal appearance.   Cardiovascular:      Rate and Rhythm: Normal rate and regular rhythm.      Pulses: Normal pulses.      Heart sounds: Normal heart sounds.   Pulmonary:      Effort: Pulmonary effort is normal.      Breath sounds: Normal breath sounds.   Musculoskeletal:      Lumbar back: Spasms and tenderness present. Positive right straight leg raise test.      Right hip: Normal.      Left hip: Normal.      Right upper leg: No tenderness.      Left upper leg: No tenderness.   Skin:     General: Skin is warm and dry.   Neurological:      Mental Status: He is alert.          POCT Results (if applicable):  Results for orders placed or performed in visit on 07/03/23   TSH Rfx On Abnormal To Free T4    Collection Time: 07/03/23  9:55 AM    Specimen: Arm, Left; Blood    Blood  Release to [facility]   Result Value Ref Range    TSH 1.350 0.450 - 4.500 uIU/mL   Lipid Panel    Collection " Time: 07/03/23  9:55 AM    Specimen: Arm, Left; Blood    Blood  Release to ever   Result Value Ref Range    Total Cholesterol 158 100 - 199 mg/dL    Triglycerides 171 (H) 0 - 149 mg/dL    HDL Cholesterol 36 (L) >39 mg/dL    VLDL Cholesterol Bryce 30 5 - 40 mg/dL    LDL Chol Calc (NIH) 92 0 - 99 mg/dL   Comprehensive Metabolic Panel    Collection Time: 07/03/23  9:55 AM    Specimen: Arm, Left; Blood    Blood  Release to ever   Result Value Ref Range    Glucose 87 70 - 99 mg/dL    BUN 10 6 - 20 mg/dL    Creatinine 1.13 0.76 - 1.27 mg/dL    EGFR Result 88 >59 mL/min/1.73    BUN/Creatinine Ratio 9 9 - 20    Sodium 139 134 - 144 mmol/L    Potassium 4.2 3.5 - 5.2 mmol/L    Chloride 100 96 - 106 mmol/L    Total CO2 25 20 - 29 mmol/L    Calcium 9.9 8.7 - 10.2 mg/dL    Total Protein 7.5 6.0 - 8.5 g/dL    Albumin 4.8 4.0 - 5.0 g/dL    Globulin 2.7 1.5 - 4.5 g/dL    A/G Ratio 1.8 1.2 - 2.2    Total Bilirubin 1.7 (H) 0.0 - 1.2 mg/dL    Alkaline Phosphatase 74 44 - 121 IU/L    AST (SGOT) 27 0 - 40 IU/L    ALT (SGPT) 42 0 - 44 IU/L   Hepatitis C Antibody    Collection Time: 07/03/23  9:55 AM    Specimen: Arm, Left; Blood    Blood  Release to ever   Result Value Ref Range    Hep C Virus Ab Non Reactive Non Reactive            Assessment and Plan     Diagnoses and all orders for this visit:    1. Anxiety (Primary)  Assessment & Plan:  For the last year patient has been taking Wellbutrin for anxiety that developed after being stuck at home a work leave due to a previous back injury.  He stated while at home and sedentary he became very agitated and irritable.  During his visit 2 months ago he was experiencing some increase stressors at work feeling that his 300 mg daily Wellbutrin dosing was no longer controlling his symptoms.  He had become very agitated and irritable at work and was taking his frustration out on his family when returning home.  He was initiated on Vraylar 1.5 mg daily in addition to his bupropion 300 mg dosing.   Initially patient received excellent benefit, however over the last week or 2 the efficacy is waned.  -Continue bupropion 300 mg daily.  -Increase Vraylar to 3 mg daily  -Follow-up in 4 weeks    Orders:  -     Cariprazine HCl (Vraylar) 3 MG capsule capsule; Take 1 capsule by mouth Daily.  Dispense: 90 capsule; Refill: 1    2. S/P discectomy  Assessment & Plan:  Patient has been treated over the years by Dr. Cali, neurosurgery for disc issues. He went left L5-S1 discectomy in 2014.       3. Chronic right-sided low back pain without sciatica  Assessment & Plan:  presents today for a couple of days of low right sided back pain. He has taken naproxen 500mg with some relief. Worse upon awakening in the morning. Pain also significant when moving from sitting to standing position. He has not utilized heat or cold compress. He has positive right straight leg raise in office today as well as some mild tenderness on palpation.  -Will give work excuse for the rest of the week  -Treat with round of prednisone and cyclobenzaprine as directed  -Advised on gentle stretching exercising and use of heating pad  -Advise if no improvement      Other orders  -     predniSONE (DELTASONE) 20 MG tablet; Take 2 tablets by mouth Daily for 7 days.  Dispense: 14 tablet; Refill: 0  -     cyclobenzaprine (FLEXERIL) 5 MG tablet; Take 1 tablet by mouth 3 (Three) Times a Day As Needed for Muscle Spasms.  Dispense: 15 tablet; Refill: 0           Follow Up  Return in about 4 weeks (around 11/27/2024) for Annual physical.    ROSETTA Medrano

## 2024-11-04 NOTE — ASSESSMENT & PLAN NOTE
presents today for a couple of days of low right sided back pain. He has taken naproxen 500mg with some relief. Worse upon awakening in the morning. Pain also significant when moving from sitting to standing position. He has not utilized heat or cold compress. He has positive right straight leg raise in office today as well as some mild tenderness on palpation.  -Will give work excuse for the rest of the week  -Treat with round of prednisone and cyclobenzaprine as directed  -Advised on gentle stretching exercising and use of heating pad  -Advise if no improvement

## 2024-11-04 NOTE — ASSESSMENT & PLAN NOTE
For the last year patient has been taking Wellbutrin for anxiety that developed after being stuck at home a work leave due to a previous back injury.  He stated while at home and sedentary he became very agitated and irritable.  During his visit 2 months ago he was experiencing some increase stressors at work feeling that his 300 mg daily Wellbutrin dosing was no longer controlling his symptoms.  He had become very agitated and irritable at work and was taking his frustration out on his family when returning home.  He was initiated on Vraylar 1.5 mg daily in addition to his bupropion 300 mg dosing.  Initially patient received excellent benefit, however over the last week or 2 the efficacy is waned.  -Continue bupropion 300 mg daily.  -Increase Vraylar to 3 mg daily  -Follow-up in 4 weeks

## 2024-11-04 NOTE — ASSESSMENT & PLAN NOTE
Patient has been treated over the years by Dr. Cali, neurosurgery for disc issues. He went left L5-S1 discectomy in 2014.

## 2024-11-13 ENCOUNTER — OFFICE VISIT (OUTPATIENT)
Dept: FAMILY MEDICINE CLINIC | Facility: CLINIC | Age: 34
End: 2024-11-13
Payer: COMMERCIAL

## 2024-11-13 VITALS
WEIGHT: 227 LBS | TEMPERATURE: 99 F | OXYGEN SATURATION: 98 % | RESPIRATION RATE: 18 BRPM | DIASTOLIC BLOOD PRESSURE: 74 MMHG | SYSTOLIC BLOOD PRESSURE: 132 MMHG | HEIGHT: 73 IN | HEART RATE: 81 BPM | BODY MASS INDEX: 30.09 KG/M2

## 2024-11-13 DIAGNOSIS — F32.A ANXIETY AND DEPRESSION: ICD-10-CM

## 2024-11-13 DIAGNOSIS — F41.9 ANXIETY AND DEPRESSION: ICD-10-CM

## 2024-11-13 DIAGNOSIS — Z98.890 S/P DISCECTOMY: ICD-10-CM

## 2024-11-13 DIAGNOSIS — L74.510 HYPERHIDROSIS OF AXILLA: Primary | ICD-10-CM

## 2024-11-13 PROCEDURE — 99214 OFFICE O/P EST MOD 30 MIN: CPT | Performed by: NURSE PRACTITIONER

## 2024-11-13 RX ORDER — GLYCOPYRROLATE 1 MG/1
1 TABLET ORAL 2 TIMES DAILY
Qty: 180 TABLET | Refills: 1 | Status: SHIPPED | OUTPATIENT
Start: 2024-11-13

## 2024-11-13 RX ORDER — ESCITALOPRAM OXALATE 10 MG/1
10 TABLET ORAL DAILY
Qty: 30 TABLET | Refills: 1 | Status: SHIPPED | OUTPATIENT
Start: 2024-11-13

## 2024-11-13 NOTE — PROGRESS NOTES
Office Note     Name: Hammad Pressley    : 1990     MRN: [id number]     Chief Complaint  Anxiety and Fatigue    Subjective     History of Present Illness:  Hammad Pressley is a 34 y.o. male who presents today for issues with anxiety and fatigue. For the last year patient has been taking Wellbutrin for anxiety that developed after being stuck at home a work leave due to a previous back injury. He stated while at home and sedentary he became very agitated and irritable. During his visit 2 months ago he was experiencing some increase stressors at work feeling that his 300 mg daily Wellbutrin dosing was no longer controlling his symptoms. He had become very agitated and irritable at work and was taking his frustration out on his family when returning home. He was initiated on Vraylar 1.5 mg daily in addition to his bupropion 300 mg dosing. Initially patient received excellent benefit, however over the last few weeks the efficacy is waned. Subsequently his vraylar was increased to 3mg daily. Patient has continued to have mood stability with these changes. He verbalizes having  no energy and no motivation to do anything these days. Patient states much of his disdain is work related. Patient has longstanding excessive sweating of the armpits. He has tried different antiperspirants and deodorants without benefit. He reported excessive sweating even when in a swimming pool. He has been taking glycopyrrolate 1 mg twice daily for the last several months with significant benefit. He has no further complaints or concerns today.     Review of Systems   Constitutional:  Positive for diaphoresis and fatigue.   Respiratory:  Negative for shortness of breath.    Cardiovascular:  Negative for chest pain and palpitations.   Gastrointestinal:  Negative for nausea.   Neurological:  Negative for dizziness and confusion.   Psychiatric/Behavioral:  Positive for depressed mood and stress.        Objective     Past Medical History:  "  Diagnosis Date    Anxiety     Fracture of wrist 10/12/23    Hyperhidrosis      Past Surgical History:   Procedure Laterality Date    BACK SURGERY      FRACTURE SURGERY      SHOULDER SURGERY Right     Rotator cuff repair & dislocation repair    TONSILLECTOMY      WRIST SURGERY Left 11/01/2023    Left scaphoid open reduction internal fixation -Dr. Napoles     History reviewed. No pertinent family history.    Vital Signs  /74 (BP Location: Left arm, Patient Position: Sitting, Cuff Size: Adult)   Pulse 81   Temp 99 °F (37.2 °C) (Temporal)   Resp 18   Ht 185.4 cm (73\")   Wt 103 kg (227 lb)   SpO2 98%   BMI 29.95 kg/m²   Estimated body mass index is 29.95 kg/m² as calculated from the following:    Height as of this encounter: 185.4 cm (73\").    Weight as of this encounter: 103 kg (227 lb).  Facility age limit for growth %joceline is 20 years.    Physical Exam  Vitals reviewed.   Constitutional:       Appearance: Normal appearance.   Eyes:      Conjunctiva/sclera: Conjunctivae normal.   Cardiovascular:      Rate and Rhythm: Normal rate and regular rhythm.      Pulses: Normal pulses.      Heart sounds: Normal heart sounds.   Pulmonary:      Effort: Pulmonary effort is normal.      Breath sounds: Normal breath sounds.   Musculoskeletal:      Cervical back: Neck supple.   Skin:     General: Skin is warm and dry.   Neurological:      Mental Status: He is alert and oriented to person, place, and time.   Psychiatric:         Mood and Affect: Mood normal.         Behavior: Behavior normal.         Thought Content: Thought content normal.         Judgment: Judgment normal.             POCT Results (if applicable):  Results for orders placed or performed in visit on 07/03/23   TSH Rfx On Abnormal To Free T4    Collection Time: 07/03/23  9:55 AM    Specimen: Arm, Left; Blood    Blood  Release to [facility]   Result Value Ref Range    TSH 1.350 0.450 - 4.500 uIU/mL   Lipid Panel    Collection Time: 07/03/23  9:55 AM    " Specimen: Arm, Left; Blood    Blood  Release to ever   Result Value Ref Range    Total Cholesterol 158 100 - 199 mg/dL    Triglycerides 171 (H) 0 - 149 mg/dL    HDL Cholesterol 36 (L) >39 mg/dL    VLDL Cholesterol Bryce 30 5 - 40 mg/dL    LDL Chol Calc (NIH) 92 0 - 99 mg/dL   Comprehensive Metabolic Panel    Collection Time: 07/03/23  9:55 AM    Specimen: Arm, Left; Blood    Blood  Release to ever   Result Value Ref Range    Glucose 87 70 - 99 mg/dL    BUN 10 6 - 20 mg/dL    Creatinine 1.13 0.76 - 1.27 mg/dL    EGFR Result 88 >59 mL/min/1.73    BUN/Creatinine Ratio 9 9 - 20    Sodium 139 134 - 144 mmol/L    Potassium 4.2 3.5 - 5.2 mmol/L    Chloride 100 96 - 106 mmol/L    Total CO2 25 20 - 29 mmol/L    Calcium 9.9 8.7 - 10.2 mg/dL    Total Protein 7.5 6.0 - 8.5 g/dL    Albumin 4.8 4.0 - 5.0 g/dL    Globulin 2.7 1.5 - 4.5 g/dL    A/G Ratio 1.8 1.2 - 2.2    Total Bilirubin 1.7 (H) 0.0 - 1.2 mg/dL    Alkaline Phosphatase 74 44 - 121 IU/L    AST (SGOT) 27 0 - 40 IU/L    ALT (SGPT) 42 0 - 44 IU/L   Hepatitis C Antibody    Collection Time: 07/03/23  9:55 AM    Specimen: Arm, Left; Blood    Blood  Release to ever   Result Value Ref Range    Hep C Virus Ab Non Reactive Non Reactive            Assessment and Plan     Diagnoses and all orders for this visit:    1. Hyperhidrosis of axilla (Primary)  Assessment & Plan:  Patient has longstanding excessive sweating of the armpits. He has tried different antiperspirants and deodorants without benefit. He reported excessive sweating even when in a swimming pool. He has been taking glycopyrrolate 1 mg twice daily for the last several months with significant benefit   -Refill sent for glycopyrrolate      2. Anxiety and depression  Assessment & Plan:  For the last year patient has been taking Wellbutrin for anxiety that developed after being stuck at home a work leave due to a previous back injury. He stated while at home and sedentary he became very agitated and irritable. During his  visit 2 months ago he was experiencing some increase stressors at work feeling that his 300 mg daily Wellbutrin dosing was no longer controlling his symptoms. He had become very agitated and irritable at work and was taking his frustration out on his family when returning home. He was initiated on Vraylar 1.5 mg daily in addition to his bupropion 300 mg dosing. Initially patient received excellent benefit, however over the last few weeks the efficacy is waned. Subsequently his vraylar was increased to 3mg daily. Patient has continued to have mood stability with these changes. He verbalizes having  no energy and no motivation to do anything these days. Patient states much of his disdain is work related.  Will discontinue wellbutrin.   Continue vraylar 3mg daily  Add lexapro 10mg daily. Patient advised if the medication makes his drowsy to take at night  Follow up in four weeks      3. S/P discectomy  Assessment & Plan:  Patient has been treated over the years by Dr. Cali, neurosurgery for disc issues. He went left L5-S1 discectomy in 2014.       Other orders  -     escitalopram (Lexapro) 10 MG tablet; Take 1 tablet by mouth Daily.  Dispense: 30 tablet; Refill: 1  -     glycopyrrolate (ROBINUL) 1 MG tablet; Take 1 tablet by mouth 2 (Two) Times a Day.  Dispense: 180 tablet; Refill: 1           Follow Up  No follow-ups on file.    ROSETTA Medrano

## 2024-11-19 PROBLEM — F32.A ANXIETY AND DEPRESSION: Status: ACTIVE | Noted: 2023-06-07

## 2024-11-20 ENCOUNTER — TELEPHONE (OUTPATIENT)
Dept: FAMILY MEDICINE CLINIC | Facility: CLINIC | Age: 34
End: 2024-11-20
Payer: COMMERCIAL

## 2024-11-20 NOTE — ASSESSMENT & PLAN NOTE
For the last year patient has been taking Wellbutrin for anxiety that developed after being stuck at home a work leave due to a previous back injury. He stated while at home and sedentary he became very agitated and irritable. During his visit 2 months ago he was experiencing some increase stressors at work feeling that his 300 mg daily Wellbutrin dosing was no longer controlling his symptoms. He had become very agitated and irritable at work and was taking his frustration out on his family when returning home. He was initiated on Vraylar 1.5 mg daily in addition to his bupropion 300 mg dosing. Initially patient received excellent benefit, however over the last few weeks the efficacy is waned. Subsequently his vraylar was increased to 3mg daily. Patient has continued to have mood stability with these changes. He verbalizes having  no energy and no motivation to do anything these days. Patient states much of his disdain is work related.  Will discontinue wellbutrin.   Continue vraylar 3mg daily  Add lexapro 10mg daily. Patient advised if the medication makes his drowsy to take at night  Follow up in four weeks

## 2024-11-20 NOTE — TELEPHONE ENCOUNTER
PATIENTS WIFE CALLED REGARDING PATIENT AND HIS NEW MEDICATION. SHE STATED HE WAS NOT DOING WELL AND WOULD LIKE TO HAVE A NURSE CALL HER BACK AS SOON AS POSSIBLE.    PTS WIFE, [id number], [phone]

## 2024-11-20 NOTE — ASSESSMENT & PLAN NOTE
Patient has longstanding excessive sweating of the armpits. He has tried different antiperspirants and deodorants without benefit. He reported excessive sweating even when in a swimming pool. He has been taking glycopyrrolate 1 mg twice daily for the last several months with significant benefit   -Refill sent for glycopyrrolate

## 2024-11-20 NOTE — TELEPHONE ENCOUNTER
Called and spoke with his wife and she states that since we changed medicine he has been in the bed and called her stating that he is about to loose his mind and has thoughts of harming hisself. I spoke with Aylin and she advised me to let wife know to d/c the medicine and he needs to go straight to the ER. Patients wife verbalized understanding.

## 2024-12-09 ENCOUNTER — OFFICE VISIT (OUTPATIENT)
Dept: FAMILY MEDICINE CLINIC | Facility: CLINIC | Age: 34
End: 2024-12-09
Payer: OTHER MISCELLANEOUS

## 2024-12-09 VITALS
HEART RATE: 66 BPM | DIASTOLIC BLOOD PRESSURE: 78 MMHG | RESPIRATION RATE: 18 BRPM | OXYGEN SATURATION: 98 % | SYSTOLIC BLOOD PRESSURE: 132 MMHG | TEMPERATURE: 98.2 F | HEIGHT: 73 IN | WEIGHT: 227 LBS | BODY MASS INDEX: 30.09 KG/M2

## 2024-12-09 DIAGNOSIS — G89.29 CHRONIC BILATERAL LOW BACK PAIN WITH LEFT-SIDED SCIATICA: Primary | ICD-10-CM

## 2024-12-09 DIAGNOSIS — M54.42 CHRONIC BILATERAL LOW BACK PAIN WITH LEFT-SIDED SCIATICA: Primary | ICD-10-CM

## 2024-12-09 RX ORDER — BUPROPION HYDROCHLORIDE 150 MG/1
150 TABLET ORAL DAILY
COMMUNITY

## 2024-12-09 RX ORDER — CYCLOBENZAPRINE HYDROCHLORIDE 7.5 MG/1
7.5 TABLET, FILM COATED ORAL 3 TIMES DAILY PRN
Qty: 20 TABLET | Refills: 1 | Status: SHIPPED | OUTPATIENT
Start: 2024-12-09

## 2024-12-09 RX ORDER — PREDNISONE 20 MG/1
40 TABLET ORAL DAILY
Qty: 14 TABLET | Refills: 0 | Status: SHIPPED | OUTPATIENT
Start: 2024-12-09 | End: 2024-12-16

## 2024-12-09 RX ORDER — METHYLPREDNISOLONE ACETATE 80 MG/ML
80 INJECTION, SUSPENSION INTRA-ARTICULAR; INTRALESIONAL; INTRAMUSCULAR; SOFT TISSUE ONCE
Status: COMPLETED | OUTPATIENT
Start: 2024-12-09 | End: 2024-12-09

## 2024-12-09 RX ADMIN — METHYLPREDNISOLONE ACETATE 80 MG: 80 INJECTION, SUSPENSION INTRA-ARTICULAR; INTRALESIONAL; INTRAMUSCULAR; SOFT TISSUE at 15:22

## 2024-12-09 NOTE — PROGRESS NOTES
Office Note     Name: Hammad Pressley    : 1990     MRN: [id number]     Chief Complaint  Back Injury    Subjective     History of Present Illness:  Hammad Pressley is a 34 y.o. male who presents today for lower both side back pain that is also reported to be shooting down his left leg.  Patient has longstanding issues with lumbar back pain, undergoing discectomy under the care of Dr. Cali in .  Patient last evaluated by RENITA Fuller in the neurosurgical office on 2023 when he suffered exacerbation of back and leg pain.  Patient had improved by the time of that evaluation with administration of a Toradol injection.  At that time he was returned to work and was instructed to call if he had any difficulties moving forward.  Patient presents in office today stating he was injured at work last Wednesday, however he was able to continue working the subsequent days until yesterday.  Yesterday patient suffered further injury and now is having debilitating bilateral low back pain with radiation into his left lower extremity.  Patient was also noted to have a flare of right sided back pain approximately 6 weeks ago which somewhat improved with round of prednisone and cyclobenzaprine.  Patient denies any change in bowel or bladder.  No further complaints or concern    Review of Systems   Constitutional:  Negative for fever and unexpected weight loss.   Cardiovascular:  Negative for chest pain.   Gastrointestinal:  Negative for abdominal pain.   Genitourinary:  Negative for decreased urine volume, dysuria and urinary incontinence.   Musculoskeletal:  Positive for back pain.   Neurological:  Positive for numbness. Negative for weakness.       Objective     Past Medical History:   Diagnosis Date    Anxiety     Fracture of wrist 10/12/23    Hyperhidrosis      Past Surgical History:   Procedure Laterality Date    BACK SURGERY      FRACTURE SURGERY      SHOULDER SURGERY Right     Rotator cuff repair &  "dislocation repair    TONSILLECTOMY      WRIST SURGERY Left 11/01/2023    Left scaphoid open reduction internal fixation -Dr. Napoles     History reviewed. No pertinent family history.    Vital Signs  /78 (BP Location: Right arm, Patient Position: Sitting, Cuff Size: Adult)   Pulse 66   Temp 98.2 °F (36.8 °C) (Temporal)   Resp 18   Ht 185.4 cm (73\")   Wt 103 kg (227 lb)   SpO2 98%   BMI 29.95 kg/m²   Estimated body mass index is 29.95 kg/m² as calculated from the following:    Height as of this encounter: 185.4 cm (73\").    Weight as of this encounter: 103 kg (227 lb).  Facility age limit for growth %joceline is 20 years.    Physical Exam  Constitutional:       Appearance: Normal appearance.   HENT:      Right Ear: Tympanic membrane normal.      Left Ear: Tympanic membrane normal.   Cardiovascular:      Rate and Rhythm: Normal rate and regular rhythm.      Heart sounds: Normal heart sounds.   Pulmonary:      Effort: Pulmonary effort is normal.      Breath sounds: Normal breath sounds.   Musculoskeletal:      Cervical back: Neck supple.      Lumbar back: Tenderness present. Positive right straight leg raise test and positive left straight leg raise test.   Skin:     General: Skin is warm and dry.   Neurological:      Mental Status: He is oriented to person, place, and time.   Psychiatric:         Mood and Affect: Mood normal.         Behavior: Behavior normal.         Thought Content: Thought content normal.         Judgment: Judgment normal.            POCT Results (if applicable):  Results for orders placed or performed in visit on 07/03/23   TSH Rfx On Abnormal To Free T4    Collection Time: 07/03/23  9:55 AM    Specimen: Arm, Left; Blood    Blood  Release to ever   Result Value Ref Range    TSH 1.350 0.450 - 4.500 uIU/mL   Lipid Panel    Collection Time: 07/03/23  9:55 AM    Specimen: Arm, Left; Blood    Blood  Release to ever   Result Value Ref Range    Total Cholesterol 158 100 - 199 mg/dL    " Triglycerides 171 (H) 0 - 149 mg/dL    HDL Cholesterol 36 (L) >39 mg/dL    VLDL Cholesterol Bryce 30 5 - 40 mg/dL    LDL Chol Calc (NIH) 92 0 - 99 mg/dL   Comprehensive Metabolic Panel    Collection Time: 07/03/23  9:55 AM    Specimen: Arm, Left; Blood    Blood  Release to ever   Result Value Ref Range    Glucose 87 70 - 99 mg/dL    BUN 10 6 - 20 mg/dL    Creatinine 1.13 0.76 - 1.27 mg/dL    EGFR Result 88 >59 mL/min/1.73    BUN/Creatinine Ratio 9 9 - 20    Sodium 139 134 - 144 mmol/L    Potassium 4.2 3.5 - 5.2 mmol/L    Chloride 100 96 - 106 mmol/L    Total CO2 25 20 - 29 mmol/L    Calcium 9.9 8.7 - 10.2 mg/dL    Total Protein 7.5 6.0 - 8.5 g/dL    Albumin 4.8 4.0 - 5.0 g/dL    Globulin 2.7 1.5 - 4.5 g/dL    A/G Ratio 1.8 1.2 - 2.2    Total Bilirubin 1.7 (H) 0.0 - 1.2 mg/dL    Alkaline Phosphatase 74 44 - 121 IU/L    AST (SGOT) 27 0 - 40 IU/L    ALT (SGPT) 42 0 - 44 IU/L   Hepatitis C Antibody    Collection Time: 07/03/23  9:55 AM    Specimen: Arm, Left; Blood    Blood  Release to ever   Result Value Ref Range    Hep C Virus Ab Non Reactive Non Reactive            Assessment and Plan     Diagnoses and all orders for this visit:    1. Chronic bilateral low back pain with left-sided sciatica (Primary)  Assessment & Plan:  presents today for lower both side back pain that is also reported to be shooting down his left leg.  Patient has longstanding issues with lumbar back pain, undergoing discectomy under the care of Dr. Cali in 2014.  Patient last evaluated by RENITA Fuller in the neurosurgical office on June 7, 2023 when he suffered exacerbation of back and leg pain.  Patient had improved by the time of that evaluation with administration of a Toradol injection.  At that time he was returned to work and was instructed to call if he had any difficulties moving forward.  Patient presents in office today stating he was injured at work last Wednesday, however he was able to continue working the subsequent days until  yesterday.  Yesterday patient suffered further injury and now is having debilitating bilateral low back pain with radiation into his left lower extremity.  Patient was also noted to have a flare of right sided back pain approximately 6 weeks ago which somewhat improved with round of prednisone and cyclobenzaprine.  Patient rating his pain 10 out of 10 at the current time.  He has failed to respond to conservative measures such as over-the-counter NSAIDs, Tylenol, cold or warm compress.  -Back to neurosurgery for updated evaluation.  In the meantime we will treat with round of prednisone and cyclobenzaprine therapy as it has provided benefit in the past.  Patient also given intermuscular methylprednisolone shot in office today.  Reminded of gentle stretching exercises.  Patient will need to be off work to reevaluate did by neurosurgery.    Orders:  -     methylPREDNISolone acetate (DEPO-medrol) injection 80 mg  -     predniSONE (DELTASONE) 20 MG tablet; Take 2 tablets by mouth Daily for 7 days.  Dispense: 14 tablet; Refill: 0  -     cyclobenzaprine (FEXMID) 7.5 MG tablet; Take 1 tablet by mouth 3 (Three) Times a Day As Needed for Muscle Spasms.  Dispense: 20 tablet; Refill: 1  -     Ambulatory Referral to Neurosurgery           Follow Up  No follow-ups on file.    ROSETTA Medrano

## 2024-12-13 NOTE — ASSESSMENT & PLAN NOTE
presents today for lower both side back pain that is also reported to be shooting down his left leg.  Patient has longstanding issues with lumbar back pain, undergoing discectomy under the care of Dr. Cali in 2014.  Patient last evaluated by RENITA Fuller in the neurosurgical office on June 7, 2023 when he suffered exacerbation of back and leg pain.  Patient had improved by the time of that evaluation with administration of a Toradol injection.  At that time he was returned to work and was instructed to call if he had any difficulties moving forward.  Patient presents in office today stating he was injured at work last Wednesday, however he was able to continue working the subsequent days until yesterday.  Yesterday patient suffered further injury and now is having debilitating bilateral low back pain with radiation into his left lower extremity.  Patient was also noted to have a flare of right sided back pain approximately 6 weeks ago which somewhat improved with round of prednisone and cyclobenzaprine.  Patient rating his pain 10 out of 10 at the current time.  He has failed to respond to conservative measures such as over-the-counter NSAIDs, Tylenol, cold or warm compress.  -Back to neurosurgery for updated evaluation.  In the meantime we will treat with round of prednisone and cyclobenzaprine therapy as it has provided benefit in the past.  Patient also given intermuscular methylprednisolone shot in office today.  Reminded of gentle stretching exercises.  Patient will need to be off work to reevaluate did by neurosurgery.

## 2024-12-23 ENCOUNTER — TELEPHONE (OUTPATIENT)
Dept: FAMILY MEDICINE CLINIC | Facility: CLINIC | Age: 34
End: 2024-12-23
Payer: COMMERCIAL

## 2024-12-23 RX ORDER — BUPROPION HYDROCHLORIDE 150 MG/1
150 TABLET ORAL DAILY
Qty: 90 TABLET | Refills: 1 | Status: SHIPPED | OUTPATIENT
Start: 2024-12-23

## 2025-01-08 ENCOUNTER — OFFICE VISIT (OUTPATIENT)
Dept: NEUROSURGERY | Facility: CLINIC | Age: 35
End: 2025-01-08
Payer: OTHER MISCELLANEOUS

## 2025-01-08 VITALS
BODY MASS INDEX: 30.15 KG/M2 | DIASTOLIC BLOOD PRESSURE: 64 MMHG | HEIGHT: 73 IN | WEIGHT: 227.5 LBS | TEMPERATURE: 97.3 F | SYSTOLIC BLOOD PRESSURE: 112 MMHG

## 2025-01-08 DIAGNOSIS — M54.16 LEFT LUMBAR RADICULOPATHY: Primary | ICD-10-CM

## 2025-01-08 DIAGNOSIS — M51.362 DEGENERATION OF INTERVERTEBRAL DISC OF LUMBAR REGION WITH DISCOGENIC BACK PAIN AND LOWER EXTREMITY PAIN: ICD-10-CM

## 2025-01-08 PROCEDURE — 99214 OFFICE O/P EST MOD 30 MIN: CPT | Performed by: PHYSICIAN ASSISTANT

## 2025-01-08 RX ORDER — GABAPENTIN 300 MG/1
CAPSULE ORAL
Qty: 90 CAPSULE | Refills: 1 | Status: SHIPPED | OUTPATIENT
Start: 2025-01-08

## 2025-01-08 NOTE — PROGRESS NOTES
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  He has been treated with a Toradol injection and a steroid with no improvement in symptoms. Pain is worse with standing. He gets relief with lying down. He has not had formal therapy. In the past it has not helped. No right sided symptoms. No bowel or bladder difficulties. He has been off work.     Past Medical History:   Diagnosis Date    Anxiety     Fracture of wrist 10/12/23    Hyperhidrosis          Current Outpatient Medications:     buPROPion XL (WELLBUTRIN XL) 150 MG 24 hr tablet, Take 1 tablet by mouth Daily., Disp: 90 tablet, Rfl: 1    Cariprazine HCl (Vraylar) 1.5 MG capsule capsule, Take 1 capsule by mouth Daily., Disp: , Rfl:     cyclobenzaprine (FEXMID) 7.5 MG tablet, Take 1 tablet by mouth 3 (Three) Times a Day As Needed for Muscle Spasms., Disp: 20 tablet, Rfl: 1    glycopyrrolate (ROBINUL) 1 MG tablet, Take 1 tablet by mouth 2 (Two) Times a Day., Disp: 180 tablet, Rfl: 1    gabapentin (NEURONTIN) 300 MG capsule, One tab PO Qhs x3 days, then one tab BID x 3 days, then one tab TID., Disp: 90 capsule, Rfl: 1    Past Surgical History:   Procedure Laterality Date    BACK SURGERY      FRACTURE SURGERY      SHOULDER SURGERY Right     Rotator cuff repair & dislocation repair    TONSILLECTOMY      WRIST SURGERY Left 2023    Left scaphoid open reduction internal fixation -Dr. Napoles       Social History     Socioeconomic History    Marital status:    Tobacco Use    Smoking status: Never    Smokeless tobacco: Never   Vaping Use    Vaping status: Never Used    Substance and Sexual Activity    Alcohol use: Not Currently     Alcohol/week: 1.0 standard drink of alcohol    Drug use: Never    Sexual activity: Defer         Review of Systems   Constitutional:  Negative for activity change, appetite change, chills, diaphoresis, fatigue, fever and unexpected weight change.   HENT:  Negative for congestion, dental problem, drooling, ear discharge, ear pain, facial swelling, hearing loss, mouth sores, nosebleeds, postnasal drip, rhinorrhea, sinus pressure, sinus pain, sneezing, sore throat, tinnitus, trouble swallowing and voice change.    Eyes:  Negative for photophobia, pain, discharge, redness, itching and visual disturbance.   Respiratory:  Negative for apnea, cough, choking, chest tightness, shortness of breath, wheezing and stridor.    Cardiovascular:  Negative for chest pain, palpitations and leg swelling.   Gastrointestinal:  Negative for abdominal distention, abdominal pain, anal bleeding, blood in stool, constipation, diarrhea, nausea, rectal pain and vomiting.   Endocrine: Negative for cold intolerance, heat intolerance, polydipsia, polyphagia and polyuria.   Genitourinary:  Negative for decreased urine volume, difficulty urinating, dysuria, enuresis, flank pain, frequency, genital sores, hematuria and urgency.   Musculoskeletal:  Positive for back pain. Negative for arthralgias, gait problem, joint swelling, myalgias, neck pain and neck stiffness.   Skin:  Negative for color change, pallor, rash and wound.   Allergic/Immunologic: Negative for environmental allergies, food allergies and immunocompromised state.   Neurological:  Positive for numbness. Negative for dizziness, tremors, seizures, syncope, facial asymmetry, speech difficulty, weakness, light-headedness and headaches.   Hematological:  Negative for adenopathy. Does not bruise/bleed easily.   Psychiatric/Behavioral:  Negative for agitation, behavioral problems, confusion, decreased concentration, dysphoric  "mood, hallucinations, self-injury, sleep disturbance and suicidal ideas. The patient is not nervous/anxious and is not hyperactive.        Objective   Vital Signs: Blood pressure 112/64, temperature 97.3 °F (36.3 °C), temperature source Infrared, height 185.4 cm (73\"), weight 103 kg (227 lb 8 oz).  Physical Exam  Vitals and nursing note reviewed.   Constitutional:       General: He is not in acute distress.     Appearance: He is well-developed.   Eyes:      Pupils: Pupils are equal, round, and reactive to light.   Cardiovascular:      Heart sounds: Normal heart sounds.   Pulmonary:      Breath sounds: Normal breath sounds.   Psychiatric:         Behavior: Behavior normal.         Thought Content: Thought content normal.     Musculoskeletal:     Strength is intact in upper and lower extremities to direct testing.     Station and gait are normal.     Straight leg raising is negative.   Neurologic:     Muscle tone is normal throughout.     Sensation is intact to light touch throughout.     Patient is oriented to person, place, and time.     Reflexes intact except left S1       Independent review of radiographic imaging: no recent imaging.     Assessment & Plan   Diagnosis: Acute recurrent lumbar radiculopathy    Medical Decision Making: I recommended a conservative course including physical therapy and gabapentin. He will follow up with me with an mri of the lumbar spine with and without gadolinium. Call the office in the interim with new or worsening symptoms.             Diagnoses and all orders for this visit:    1. Left lumbar radiculopathy (Primary)    2. Degeneration of intervertebral disc of lumbar region with discogenic back pain and lower extremity pain                                Rossy Dunne PA-C  Patient Care Team:  Aylin Monique APRN as PCP - General (Family Medicine)  Brianne Napoles MD as Consulting Physician (Orthopedic Surgery)                "

## 2025-01-29 ENCOUNTER — OFFICE VISIT (OUTPATIENT)
Dept: NEUROSURGERY | Facility: CLINIC | Age: 35
End: 2025-01-29
Payer: OTHER MISCELLANEOUS

## 2025-01-29 VITALS — TEMPERATURE: 97.7 F | BODY MASS INDEX: 30.48 KG/M2 | HEIGHT: 73 IN | WEIGHT: 230 LBS

## 2025-01-29 DIAGNOSIS — M54.16 LEFT LUMBAR RADICULOPATHY: Primary | ICD-10-CM

## 2025-01-29 DIAGNOSIS — M51.362 DEGENERATION OF INTERVERTEBRAL DISC OF LUMBAR REGION WITH DISCOGENIC BACK PAIN AND LOWER EXTREMITY PAIN: ICD-10-CM

## 2025-01-29 PROCEDURE — 99214 OFFICE O/P EST MOD 30 MIN: CPT | Performed by: PHYSICIAN ASSISTANT

## 2025-01-29 RX ORDER — CHLORHEXIDINE GLUCONATE 40 MG/ML
SOLUTION TOPICAL
Qty: 120 ML | Refills: 0 | Status: SHIPPED | OUTPATIENT
Start: 2025-01-29

## 2025-01-29 RX ORDER — FAMOTIDINE 10 MG
20 TABLET ORAL
OUTPATIENT
Start: 2025-01-29

## 2025-01-29 NOTE — PROGRESS NOTES
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  He has been treated with a Toradol injection and a steroid with no improvement in symptoms. Pain is worse with standing. He gets relief with lying down. He tried formal physical therapy which was more aggravating and has not helped. He is taking gabapentin. No right sided symptoms. No bowel or bladder difficulties. He has been off work.       Past Medical History:   Diagnosis Date    Anxiety     Fracture of wrist 10/12/23    Hyperhidrosis          Current Outpatient Medications:     buPROPion XL (WELLBUTRIN XL) 150 MG 24 hr tablet, Take 1 tablet by mouth Daily., Disp: 90 tablet, Rfl: 1    Cariprazine HCl (Vraylar) 1.5 MG capsule capsule, Take 1 capsule by mouth Daily., Disp: , Rfl:     cyclobenzaprine (FEXMID) 7.5 MG tablet, Take 1 tablet by mouth 3 (Three) Times a Day As Needed for Muscle Spasms., Disp: 20 tablet, Rfl: 1    gabapentin (NEURONTIN) 300 MG capsule, One tab PO Qhs x3 days, then one tab BID x 3 days, then one tab TID., Disp: 90 capsule, Rfl: 1    glycopyrrolate (ROBINUL) 1 MG tablet, Take 1 tablet by mouth 2 (Two) Times a Day., Disp: 180 tablet, Rfl: 1    Past Surgical History:   Procedure Laterality Date    BACK SURGERY      FRACTURE SURGERY      SHOULDER SURGERY Right     Rotator cuff repair & dislocation repair    TONSILLECTOMY      WRIST SURGERY Left 2023    Left scaphoid open reduction internal fixation -Dr. Napoles       Social History     Socioeconomic History    Marital status:    Tobacco Use    Smoking status: Never    Smokeless tobacco:  Never   Vaping Use    Vaping status: Never Used   Substance and Sexual Activity    Alcohol use: Not Currently     Alcohol/week: 1.0 standard drink of alcohol    Drug use: Never    Sexual activity: Defer         Review of Systems   Constitutional:  Negative for activity change, appetite change, chills, diaphoresis, fatigue, fever and unexpected weight change.   HENT:  Negative for congestion, dental problem, drooling, ear discharge, ear pain, facial swelling, hearing loss, mouth sores, nosebleeds, postnasal drip, rhinorrhea, sinus pressure, sinus pain, sneezing, sore throat, tinnitus, trouble swallowing and voice change.    Eyes:  Negative for photophobia, pain, discharge, redness, itching and visual disturbance.   Respiratory:  Negative for apnea, cough, choking, chest tightness, shortness of breath, wheezing and stridor.    Cardiovascular:  Negative for chest pain, palpitations and leg swelling.   Gastrointestinal:  Negative for abdominal distention, abdominal pain, anal bleeding, blood in stool, constipation, diarrhea, nausea, rectal pain and vomiting.   Endocrine: Negative for cold intolerance, heat intolerance, polydipsia, polyphagia and polyuria.   Genitourinary:  Negative for decreased urine volume, difficulty urinating, dysuria, enuresis, flank pain, frequency, genital sores, hematuria and urgency.   Musculoskeletal:  Positive for back pain. Negative for arthralgias, gait problem, joint swelling, myalgias, neck pain and neck stiffness.   Skin:  Negative for color change, pallor, rash and wound.   Allergic/Immunologic: Negative for environmental allergies, food allergies and immunocompromised state.   Neurological:  Positive for numbness. Negative for dizziness, tremors, seizures, syncope, facial asymmetry, speech difficulty, weakness, light-headedness and headaches.   Hematological:  Negative for adenopathy. Does not bruise/bleed easily.   Psychiatric/Behavioral:  Negative for agitation, behavioral problems,  "confusion, decreased concentration, dysphoric mood, hallucinations, self-injury, sleep disturbance and suicidal ideas. The patient is not nervous/anxious and is not hyperactive.        Objective   Vital Signs: Temperature 97.7 °F (36.5 °C), temperature source Infrared, height 185.4 cm (73\"), weight 104 kg (230 lb).  Physical Exam  Vitals and nursing note reviewed.   Constitutional:       General: He is not in acute distress.     Appearance: He is well-developed.   Eyes:      Pupils: Pupils are equal, round, and reactive to light.   Cardiovascular:      Heart sounds: Normal heart sounds.   Pulmonary:      Breath sounds: Normal breath sounds.   Psychiatric:         Behavior: Behavior normal.         Thought Content: Thought content normal.     Musculoskeletal:     Strength is intact in upper and lower extremities to direct testing.     Station and gait are normal.     Straight leg raising is negative.   Neurologic:     Muscle tone is normal throughout.     Sensation is intact to light touch throughout.     Patient is oriented to person, place, and time.     Reflexes intact except left S1       Independent review of radiographic imaging: MRI of the lumbar spine demonstrates recurrent disc protrusion at L5-S1 with central and leftward narrowing.     Assessment & Plan   Diagnosis: Lumbar radiculopathy with recurrent disc herniation     Medical Decision Making: Patient has tried physical therapy and medications to no avail. Symptoms are constant and limiting. Dr Cali has recommended redo discectomy at L5-S1 on the left. He stepped in today to discuss the surgery as well as risks, limitations, and possible complications. Patient is in agreement to proceed.             Diagnoses and all orders for this visit:    1. Left lumbar radiculopathy (Primary)    2. Degeneration of intervertebral disc of lumbar region with discogenic back pain and lower extremity pain                                Rossy Dunne, " LUIS ENRIQUE  Patient Care Team:  Aylin Monique APRN as PCP - General (Family Medicine)  Brianne Napoles MD as Consulting Physician (Orthopedic Surgery)

## 2025-01-29 NOTE — H&P (VIEW-ONLY)
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  He has been treated with a Toradol injection and a steroid with no improvement in symptoms. Pain is worse with standing. He gets relief with lying down. He tried formal physical therapy which was more aggravating and has not helped. He is taking gabapentin. No right sided symptoms. No bowel or bladder difficulties. He has been off work.       Past Medical History:   Diagnosis Date    Anxiety     Fracture of wrist 10/12/23    Hyperhidrosis          Current Outpatient Medications:     buPROPion XL (WELLBUTRIN XL) 150 MG 24 hr tablet, Take 1 tablet by mouth Daily., Disp: 90 tablet, Rfl: 1    Cariprazine HCl (Vraylar) 1.5 MG capsule capsule, Take 1 capsule by mouth Daily., Disp: , Rfl:     cyclobenzaprine (FEXMID) 7.5 MG tablet, Take 1 tablet by mouth 3 (Three) Times a Day As Needed for Muscle Spasms., Disp: 20 tablet, Rfl: 1    gabapentin (NEURONTIN) 300 MG capsule, One tab PO Qhs x3 days, then one tab BID x 3 days, then one tab TID., Disp: 90 capsule, Rfl: 1    glycopyrrolate (ROBINUL) 1 MG tablet, Take 1 tablet by mouth 2 (Two) Times a Day., Disp: 180 tablet, Rfl: 1    Past Surgical History:   Procedure Laterality Date    BACK SURGERY      FRACTURE SURGERY      SHOULDER SURGERY Right     Rotator cuff repair & dislocation repair    TONSILLECTOMY      WRIST SURGERY Left 2023    Left scaphoid open reduction internal fixation -Dr. Napoles       Social History     Socioeconomic History    Marital status:    Tobacco Use    Smoking status: Never    Smokeless tobacco:  Never   Vaping Use    Vaping status: Never Used   Substance and Sexual Activity    Alcohol use: Not Currently     Alcohol/week: 1.0 standard drink of alcohol    Drug use: Never    Sexual activity: Defer         Review of Systems   Constitutional:  Negative for activity change, appetite change, chills, diaphoresis, fatigue, fever and unexpected weight change.   HENT:  Negative for congestion, dental problem, drooling, ear discharge, ear pain, facial swelling, hearing loss, mouth sores, nosebleeds, postnasal drip, rhinorrhea, sinus pressure, sinus pain, sneezing, sore throat, tinnitus, trouble swallowing and voice change.    Eyes:  Negative for photophobia, pain, discharge, redness, itching and visual disturbance.   Respiratory:  Negative for apnea, cough, choking, chest tightness, shortness of breath, wheezing and stridor.    Cardiovascular:  Negative for chest pain, palpitations and leg swelling.   Gastrointestinal:  Negative for abdominal distention, abdominal pain, anal bleeding, blood in stool, constipation, diarrhea, nausea, rectal pain and vomiting.   Endocrine: Negative for cold intolerance, heat intolerance, polydipsia, polyphagia and polyuria.   Genitourinary:  Negative for decreased urine volume, difficulty urinating, dysuria, enuresis, flank pain, frequency, genital sores, hematuria and urgency.   Musculoskeletal:  Positive for back pain. Negative for arthralgias, gait problem, joint swelling, myalgias, neck pain and neck stiffness.   Skin:  Negative for color change, pallor, rash and wound.   Allergic/Immunologic: Negative for environmental allergies, food allergies and immunocompromised state.   Neurological:  Positive for numbness. Negative for dizziness, tremors, seizures, syncope, facial asymmetry, speech difficulty, weakness, light-headedness and headaches.   Hematological:  Negative for adenopathy. Does not bruise/bleed easily.   Psychiatric/Behavioral:  Negative for agitation, behavioral problems,  "confusion, decreased concentration, dysphoric mood, hallucinations, self-injury, sleep disturbance and suicidal ideas. The patient is not nervous/anxious and is not hyperactive.        Objective   Vital Signs: Temperature 97.7 °F (36.5 °C), temperature source Infrared, height 185.4 cm (73\"), weight 104 kg (230 lb).  Physical Exam  Vitals and nursing note reviewed.   Constitutional:       General: He is not in acute distress.     Appearance: He is well-developed.   Eyes:      Pupils: Pupils are equal, round, and reactive to light.   Cardiovascular:      Heart sounds: Normal heart sounds.   Pulmonary:      Breath sounds: Normal breath sounds.   Psychiatric:         Behavior: Behavior normal.         Thought Content: Thought content normal.     Musculoskeletal:     Strength is intact in upper and lower extremities to direct testing.     Station and gait are normal.     Straight leg raising is negative.   Neurologic:     Muscle tone is normal throughout.     Sensation is intact to light touch throughout.     Patient is oriented to person, place, and time.     Reflexes intact except left S1       Independent review of radiographic imaging: MRI of the lumbar spine demonstrates recurrent disc protrusion at L5-S1 with central and leftward narrowing.     Assessment & Plan   Diagnosis: Lumbar radiculopathy with recurrent disc herniation     Medical Decision Making: Patient has tried physical therapy and medications to no avail. Symptoms are constant and limiting. Dr Cali has recommended redo discectomy at L5-S1 on the left. He stepped in today to discuss the surgery as well as risks, limitations, and possible complications. Patient is in agreement to proceed.             Diagnoses and all orders for this visit:    1. Left lumbar radiculopathy (Primary)    2. Degeneration of intervertebral disc of lumbar region with discogenic back pain and lower extremity pain                                Rossy Dunne, " LUIS ENRIQUE  Patient Care Team:  Aylin Monique APRN as PCP - General (Family Medicine)  Brianne Napoles MD as Consulting Physician (Orthopedic Surgery)

## 2025-01-31 ENCOUNTER — TELEPHONE (OUTPATIENT)
Dept: FAMILY MEDICINE CLINIC | Facility: CLINIC | Age: 35
End: 2025-01-31
Payer: COMMERCIAL

## 2025-01-31 RX ORDER — BUPROPION HYDROCHLORIDE 150 MG/1
150 TABLET ORAL DAILY
Qty: 90 TABLET | Refills: 1 | Status: SHIPPED | OUTPATIENT
Start: 2025-01-31

## 2025-02-14 DIAGNOSIS — M54.16 LEFT LUMBAR RADICULOPATHY: Primary | ICD-10-CM

## 2025-02-14 RX ORDER — CHLORHEXIDINE GLUCONATE 40 MG/ML
SOLUTION TOPICAL
Qty: 120 ML | Refills: 0 | Status: SHIPPED | OUTPATIENT
Start: 2025-02-14

## 2025-02-17 ENCOUNTER — PRE-ADMISSION TESTING (OUTPATIENT)
Dept: PREADMISSION TESTING | Facility: HOSPITAL | Age: 35
End: 2025-02-17
Payer: OTHER MISCELLANEOUS

## 2025-02-17 VITALS — HEIGHT: 73 IN | WEIGHT: 233.91 LBS | BODY MASS INDEX: 31 KG/M2

## 2025-02-17 DIAGNOSIS — M54.16 LEFT LUMBAR RADICULOPATHY: ICD-10-CM

## 2025-02-17 LAB
ALBUMIN SERPL-MCNC: 4.6 G/DL (ref 3.5–5.2)
ALBUMIN/GLOB SERPL: 1.7 G/DL
ALP SERPL-CCNC: 65 U/L (ref 39–117)
ALT SERPL W P-5'-P-CCNC: 37 U/L (ref 1–41)
ANION GAP SERPL CALCULATED.3IONS-SCNC: 10 MMOL/L (ref 5–15)
AST SERPL-CCNC: 23 U/L (ref 1–40)
BASOPHILS # BLD AUTO: 0.05 10*3/MM3 (ref 0–0.2)
BASOPHILS NFR BLD AUTO: 0.7 % (ref 0–1.5)
BILIRUB SERPL-MCNC: 1.7 MG/DL (ref 0–1.2)
BUN SERPL-MCNC: 13 MG/DL (ref 6–20)
BUN/CREAT SERPL: 10.5 (ref 7–25)
CALCIUM SPEC-SCNC: 9.9 MG/DL (ref 8.6–10.5)
CHLORIDE SERPL-SCNC: 102 MMOL/L (ref 98–107)
CO2 SERPL-SCNC: 28 MMOL/L (ref 22–29)
CREAT SERPL-MCNC: 1.24 MG/DL (ref 0.76–1.27)
DEPRECATED RDW RBC AUTO: 40.1 FL (ref 37–54)
EGFRCR SERPLBLD CKD-EPI 2021: 78.2 ML/MIN/1.73
EOSINOPHIL # BLD AUTO: 0.13 10*3/MM3 (ref 0–0.4)
EOSINOPHIL NFR BLD AUTO: 1.8 % (ref 0.3–6.2)
ERYTHROCYTE [DISTWIDTH] IN BLOOD BY AUTOMATED COUNT: 12.3 % (ref 12.3–15.4)
GLOBULIN UR ELPH-MCNC: 2.7 GM/DL
GLUCOSE SERPL-MCNC: 96 MG/DL (ref 65–99)
HCT VFR BLD AUTO: 43.6 % (ref 37.5–51)
HGB BLD-MCNC: 14.9 G/DL (ref 13–17.7)
IMM GRANULOCYTES # BLD AUTO: 0.03 10*3/MM3 (ref 0–0.05)
IMM GRANULOCYTES NFR BLD AUTO: 0.4 % (ref 0–0.5)
LYMPHOCYTES # BLD AUTO: 1.58 10*3/MM3 (ref 0.7–3.1)
LYMPHOCYTES NFR BLD AUTO: 21.5 % (ref 19.6–45.3)
MCH RBC QN AUTO: 30.7 PG (ref 26.6–33)
MCHC RBC AUTO-ENTMCNC: 34.2 G/DL (ref 31.5–35.7)
MCV RBC AUTO: 89.7 FL (ref 79–97)
MONOCYTES # BLD AUTO: 0.58 10*3/MM3 (ref 0.1–0.9)
MONOCYTES NFR BLD AUTO: 7.9 % (ref 5–12)
NEUTROPHILS NFR BLD AUTO: 4.99 10*3/MM3 (ref 1.7–7)
NEUTROPHILS NFR BLD AUTO: 67.7 % (ref 42.7–76)
NRBC BLD AUTO-RTO: 0 /100 WBC (ref 0–0.2)
PLATELET # BLD AUTO: 225 10*3/MM3 (ref 140–450)
PMV BLD AUTO: 9.4 FL (ref 6–12)
POTASSIUM SERPL-SCNC: 4.2 MMOL/L (ref 3.5–5.2)
PROT SERPL-MCNC: 7.3 G/DL (ref 6–8.5)
RBC # BLD AUTO: 4.86 10*6/MM3 (ref 4.14–5.8)
SODIUM SERPL-SCNC: 140 MMOL/L (ref 136–145)
WBC NRBC COR # BLD AUTO: 7.36 10*3/MM3 (ref 3.4–10.8)

## 2025-02-17 PROCEDURE — 85025 COMPLETE CBC W/AUTO DIFF WBC: CPT

## 2025-02-17 PROCEDURE — 36415 COLL VENOUS BLD VENIPUNCTURE: CPT

## 2025-02-17 PROCEDURE — 87081 CULTURE SCREEN ONLY: CPT

## 2025-02-17 PROCEDURE — 80053 COMPREHEN METABOLIC PANEL: CPT

## 2025-02-17 NOTE — PAT
An arrival time for procedure was not provided during PAT visit. If patient had any questions or concerns about their arrival time, they were instructed to contact their surgeon/physician.  Additionally, if the patient referred to an arrival time that was acquired from their my chart account, patient was encouraged to verify that time with their surgeon/physician. Arrival times are NOT provided in Pre Admission Testing Department.    Bactroban (if prescribed) and Chlorhexidine Prescription prescribed by physician before PAT visit.  Verified with patient that medication(s) were picked up from their pharmacy.  Written instructions given to patient during PAT visit.  Patient/family also instructed to complete skin prep checklist and return the checklist on the day of surgery to preoperative staff.  Patient/family verbalized understanding.    Patient to apply Chlorhexadine wipes  to surgical area (as instructed) the night before procedure and the AM of procedure. Wipes provided.    Patient instructed to drink 20 ounces of Gatorade or Gatorlyte (if diabetic) and it needs to be completed 1 hour (for Main OR patients) or 2 hours (scheduled  section & [facility] patients) before given arrival time for procedure (NO RED Gatorade and NO Gatorade Zero).    Patient verbalized understanding.    Patient viewed general PAT education video as instructed in their preoperative information received from their surgeon.  Patient stated the general PAT education video was viewed in its entirety and survey completed.  Copies of PAT general education handouts (Incentive Spirometry, Meds to Beds Program, Patient Belongings, Pre-op skin preparation instructions, Blood Glucose testing, Visitor policy, Surgery FAQ, Code H) distributed to patient if not printed. Education related to the PAT pass and skin preparation for surgery (if applicable) completed in PAT as a reinforcement to PAT education video. Patient instructed to return PAT pass  provided today as well as completed skin preparation sheet (if applicable) on the day of procedure.     Additionally if patient had not viewed video yet but intended to view it at home or in our waiting area, then referred them to the handout with QR code/link provided during PAT visit.  Encouraged patient/family to read PAT general education handouts thoroughly and notify PAT staff with any questions or concerns. Patient verbalized understanding of all information and priority content.    Patient denies any current skin issues.

## 2025-02-18 LAB — MRSA SPEC QL CULT: ABNORMAL

## 2025-02-18 RX ORDER — VANCOMYCIN/0.9 % SOD CHLORIDE 1.5G/250ML
15 PLASTIC BAG, INJECTION (ML) INTRAVENOUS ONCE
Status: CANCELLED | OUTPATIENT
Start: 2025-02-24

## 2025-02-24 ENCOUNTER — ANESTHESIA EVENT (OUTPATIENT)
Dept: PERIOP | Facility: HOSPITAL | Age: 35
End: 2025-02-24
Payer: OTHER MISCELLANEOUS

## 2025-02-24 ENCOUNTER — HOSPITAL ENCOUNTER (OUTPATIENT)
Facility: HOSPITAL | Age: 35
Setting detail: HOSPITAL OUTPATIENT SURGERY
Discharge: HOME | End: 2025-02-24
Attending: NEUROLOGICAL SURGERY | Admitting: NEUROLOGICAL SURGERY
Payer: OTHER MISCELLANEOUS

## 2025-02-24 ENCOUNTER — APPOINTMENT (OUTPATIENT)
Dept: GENERAL RADIOLOGY | Facility: HOSPITAL | Age: 35
End: 2025-02-24
Payer: OTHER MISCELLANEOUS

## 2025-02-24 ENCOUNTER — ANESTHESIA (OUTPATIENT)
Dept: PERIOP | Facility: HOSPITAL | Age: 35
End: 2025-02-24
Payer: OTHER MISCELLANEOUS

## 2025-02-24 VITALS
HEIGHT: 73 IN | RESPIRATION RATE: 16 BRPM | SYSTOLIC BLOOD PRESSURE: 128 MMHG | WEIGHT: 230 LBS | OXYGEN SATURATION: 95 % | TEMPERATURE: 98.1 F | DIASTOLIC BLOOD PRESSURE: 88 MMHG | BODY MASS INDEX: 30.48 KG/M2 | HEART RATE: 80 BPM

## 2025-02-24 DIAGNOSIS — M54.16 LEFT LUMBAR RADICULOPATHY: ICD-10-CM

## 2025-02-24 PROCEDURE — 25010000002 FENTANYL CITRATE (PF) 100 MCG/2ML SOLUTION

## 2025-02-24 PROCEDURE — 25010000002 ONDANSETRON PER 1 MG

## 2025-02-24 PROCEDURE — 25010000002 LIDOCAINE PF 1% 1 % SOLUTION

## 2025-02-24 PROCEDURE — 25010000002 METHYLPREDNISOLONE PER 40 MG: Performed by: NEUROLOGICAL SURGERY

## 2025-02-24 PROCEDURE — 25010000002 DEXAMETHASONE PER 1 MG

## 2025-02-24 PROCEDURE — 25810000003 LACTATED RINGERS PER 1000 ML: Performed by: ANESTHESIOLOGY

## 2025-02-24 PROCEDURE — 63042 LAMINOTOMY SINGLE LUMBAR: CPT | Performed by: NEUROLOGICAL SURGERY

## 2025-02-24 PROCEDURE — 25010000002 PROPOFOL 10 MG/ML EMULSION

## 2025-02-24 PROCEDURE — 25010000002 LIDOCAINE PF 1% 1 % SOLUTION: Performed by: ANESTHESIOLOGY

## 2025-02-24 PROCEDURE — 76000 FLUOROSCOPY <1 HR PHYS/QHP: CPT

## 2025-02-24 PROCEDURE — 25010000002 SUGAMMADEX 200 MG/2ML SOLUTION

## 2025-02-24 PROCEDURE — C1713 ANCHOR/SCREW BN/BN,TIS/BN: HCPCS | Performed by: NEUROLOGICAL SURGERY

## 2025-02-24 PROCEDURE — 25010000002 VANCOMYCIN 1.5-0.9 GM/500ML-% SOLUTION

## 2025-02-24 PROCEDURE — 63042 LAMINOTOMY SINGLE LUMBAR: CPT

## 2025-02-24 DEVICE — FLOSEAL WITH RECOTHROM - 5ML
Type: IMPLANTABLE DEVICE | Site: BACK | Status: FUNCTIONAL
Brand: FLOSEAL HEMOSTATIC MATRIX

## 2025-02-24 DEVICE — AVITENE ULTRAFOAM, 8 CM X 12.5 CM (3-1/8" X 5"), 100 SQ CM
Type: IMPLANTABLE DEVICE | Site: BACK | Status: FUNCTIONAL
Brand: AVITENE

## 2025-02-24 DEVICE — SSC BONE WAX
Type: IMPLANTABLE DEVICE | Site: BACK | Status: FUNCTIONAL
Brand: SSC BONE WAX

## 2025-02-24 RX ORDER — MAGNESIUM HYDROXIDE 1200 MG/15ML
LIQUID ORAL AS NEEDED
Status: DISCONTINUED | OUTPATIENT
Start: 2025-02-24 | End: 2025-02-24 | Stop reason: HOSPADM

## 2025-02-24 RX ORDER — OXYCODONE AND ACETAMINOPHEN 5; 325 MG/1; MG/1
1 TABLET ORAL 3 TIMES DAILY PRN
Qty: 12 TABLET | Refills: 0 | Status: SHIPPED | OUTPATIENT
Start: 2025-02-24

## 2025-02-24 RX ORDER — HYDROMORPHONE HYDROCHLORIDE 1 MG/ML
0.5 INJECTION, SOLUTION INTRAMUSCULAR; INTRAVENOUS; SUBCUTANEOUS
Status: DISCONTINUED | OUTPATIENT
Start: 2025-02-24 | End: 2025-02-24 | Stop reason: HOSPADM

## 2025-02-24 RX ORDER — LIDOCAINE HYDROCHLORIDE 10 MG/ML
INJECTION, SOLUTION EPIDURAL; INFILTRATION; INTRACAUDAL; PERINEURAL AS NEEDED
Status: DISCONTINUED | OUTPATIENT
Start: 2025-02-24 | End: 2025-02-24 | Stop reason: SURG

## 2025-02-24 RX ORDER — FENTANYL CITRATE 50 UG/ML
INJECTION, SOLUTION INTRAMUSCULAR; INTRAVENOUS AS NEEDED
Status: DISCONTINUED | OUTPATIENT
Start: 2025-02-24 | End: 2025-02-24 | Stop reason: SURG

## 2025-02-24 RX ORDER — PROPOFOL 10 MG/ML
VIAL (ML) INTRAVENOUS AS NEEDED
Status: DISCONTINUED | OUTPATIENT
Start: 2025-02-24 | End: 2025-02-24 | Stop reason: SURG

## 2025-02-24 RX ORDER — ONDANSETRON 2 MG/ML
4 INJECTION INTRAMUSCULAR; INTRAVENOUS ONCE AS NEEDED
Status: DISCONTINUED | OUTPATIENT
Start: 2025-02-24 | End: 2025-02-24 | Stop reason: HOSPADM

## 2025-02-24 RX ORDER — DEXAMETHASONE SODIUM PHOSPHATE 4 MG/ML
INJECTION, SOLUTION INTRA-ARTICULAR; INTRALESIONAL; INTRAMUSCULAR; INTRAVENOUS; SOFT TISSUE AS NEEDED
Status: DISCONTINUED | OUTPATIENT
Start: 2025-02-24 | End: 2025-02-24 | Stop reason: SURG

## 2025-02-24 RX ORDER — ONDANSETRON 2 MG/ML
INJECTION INTRAMUSCULAR; INTRAVENOUS AS NEEDED
Status: DISCONTINUED | OUTPATIENT
Start: 2025-02-24 | End: 2025-02-24 | Stop reason: SURG

## 2025-02-24 RX ORDER — OXYCODONE AND ACETAMINOPHEN 5; 325 MG/1; MG/1
1 TABLET ORAL ONCE AS NEEDED
Status: COMPLETED | OUTPATIENT
Start: 2025-02-24 | End: 2025-02-24

## 2025-02-24 RX ORDER — SODIUM CHLORIDE 0.9 % (FLUSH) 0.9 %
10 SYRINGE (ML) INJECTION EVERY 12 HOURS SCHEDULED
Status: DISCONTINUED | OUTPATIENT
Start: 2025-02-24 | End: 2025-02-24 | Stop reason: HOSPADM

## 2025-02-24 RX ORDER — SCOPOLAMINE 1 MG/3D
1 PATCH, EXTENDED RELEASE TRANSDERMAL ONCE
Status: DISCONTINUED | OUTPATIENT
Start: 2025-02-24 | End: 2025-02-24 | Stop reason: HOSPADM

## 2025-02-24 RX ORDER — ROCURONIUM BROMIDE 10 MG/ML
INJECTION, SOLUTION INTRAVENOUS AS NEEDED
Status: DISCONTINUED | OUTPATIENT
Start: 2025-02-24 | End: 2025-02-24 | Stop reason: SURG

## 2025-02-24 RX ORDER — SODIUM CHLORIDE, SODIUM LACTATE, POTASSIUM CHLORIDE, CALCIUM CHLORIDE 600; 310; 30; 20 MG/100ML; MG/100ML; MG/100ML; MG/100ML
9 INJECTION, SOLUTION INTRAVENOUS CONTINUOUS
Status: DISCONTINUED | OUTPATIENT
Start: 2025-02-25 | End: 2025-02-24 | Stop reason: HOSPADM

## 2025-02-24 RX ORDER — METHYLPREDNISOLONE ACETATE 40 MG/ML
INJECTION, SUSPENSION INTRA-ARTICULAR; INTRALESIONAL; INTRAMUSCULAR; SOFT TISSUE AS NEEDED
Status: DISCONTINUED | OUTPATIENT
Start: 2025-02-24 | End: 2025-02-24 | Stop reason: HOSPADM

## 2025-02-24 RX ORDER — BUPIVACAINE HYDROCHLORIDE AND EPINEPHRINE 2.5; 5 MG/ML; UG/ML
INJECTION, SOLUTION EPIDURAL; INFILTRATION; INTRACAUDAL; PERINEURAL AS NEEDED
Status: DISCONTINUED | OUTPATIENT
Start: 2025-02-24 | End: 2025-02-24 | Stop reason: HOSPADM

## 2025-02-24 RX ORDER — OXYCODONE AND ACETAMINOPHEN 5; 325 MG/1; MG/1
TABLET ORAL
Status: COMPLETED
Start: 2025-02-24 | End: 2025-02-24

## 2025-02-24 RX ORDER — FENTANYL CITRATE 50 UG/ML
50 INJECTION, SOLUTION INTRAMUSCULAR; INTRAVENOUS
Status: DISCONTINUED | OUTPATIENT
Start: 2025-02-24 | End: 2025-02-24 | Stop reason: HOSPADM

## 2025-02-24 RX ORDER — VANCOMYCIN/0.9 % SOD CHLORIDE 1.5G/250ML
15 PLASTIC BAG, INJECTION (ML) INTRAVENOUS ONCE
Status: COMPLETED | OUTPATIENT
Start: 2025-02-24 | End: 2025-02-24

## 2025-02-24 RX ORDER — SODIUM CHLORIDE 0.9 % (FLUSH) 0.9 %
10 SYRINGE (ML) INJECTION AS NEEDED
Status: DISCONTINUED | OUTPATIENT
Start: 2025-02-24 | End: 2025-02-24 | Stop reason: HOSPADM

## 2025-02-24 RX ORDER — MIDAZOLAM HYDROCHLORIDE 1 MG/ML
1 INJECTION, SOLUTION INTRAMUSCULAR; INTRAVENOUS
Status: DISCONTINUED | OUTPATIENT
Start: 2025-02-24 | End: 2025-02-24 | Stop reason: HOSPADM

## 2025-02-24 RX ORDER — LIDOCAINE HYDROCHLORIDE 10 MG/ML
0.5 INJECTION, SOLUTION EPIDURAL; INFILTRATION; INTRACAUDAL; PERINEURAL ONCE AS NEEDED
Status: COMPLETED | OUTPATIENT
Start: 2025-02-24 | End: 2025-02-24

## 2025-02-24 RX ORDER — FAMOTIDINE 20 MG/1
20 TABLET, FILM COATED ORAL
Status: COMPLETED | OUTPATIENT
Start: 2025-02-24 | End: 2025-02-24

## 2025-02-24 RX ORDER — DEXMEDETOMIDINE HYDROCHLORIDE 4 UG/ML
INJECTION, SOLUTION INTRAVENOUS AS NEEDED
Status: DISCONTINUED | OUTPATIENT
Start: 2025-02-24 | End: 2025-02-24 | Stop reason: SURG

## 2025-02-24 RX ADMIN — PROPOFOL 200 MG: 10 INJECTION, EMULSION INTRAVENOUS at 13:32

## 2025-02-24 RX ADMIN — DEXMEDETOMIDINE HYDROCHLORIDE IN 0.9% SODIUM CHLORIDE 12 MCG: 4 INJECTION INTRAVENOUS at 14:34

## 2025-02-24 RX ADMIN — DEXMEDETOMIDINE HYDROCHLORIDE IN 0.9% SODIUM CHLORIDE 12 MCG: 4 INJECTION INTRAVENOUS at 14:02

## 2025-02-24 RX ADMIN — SUGAMMADEX 200 MG: 100 INJECTION, SOLUTION INTRAVENOUS at 14:37

## 2025-02-24 RX ADMIN — Medication 1500 MG: at 12:11

## 2025-02-24 RX ADMIN — PROPOFOL 25 MCG/KG/MIN: 10 INJECTION, EMULSION INTRAVENOUS at 13:40

## 2025-02-24 RX ADMIN — SCOPOLAMINE 1 PATCH: 1.5 PATCH, EXTENDED RELEASE TRANSDERMAL at 12:07

## 2025-02-24 RX ADMIN — ROCURONIUM BROMIDE 70 MG: 10 INJECTION INTRAVENOUS at 13:32

## 2025-02-24 RX ADMIN — SODIUM CHLORIDE, POTASSIUM CHLORIDE, SODIUM LACTATE AND CALCIUM CHLORIDE: 600; 310; 30; 20 INJECTION, SOLUTION INTRAVENOUS at 12:10

## 2025-02-24 RX ADMIN — LIDOCAINE HYDROCHLORIDE 0.5 ML: 10 INJECTION, SOLUTION EPIDURAL; INFILTRATION; INTRACAUDAL; PERINEURAL at 11:47

## 2025-02-24 RX ADMIN — OXYCODONE AND ACETAMINOPHEN 1 TABLET: 5; 325 TABLET ORAL at 16:00

## 2025-02-24 RX ADMIN — ONDANSETRON 4 MG: 2 INJECTION INTRAMUSCULAR; INTRAVENOUS at 14:27

## 2025-02-24 RX ADMIN — LIDOCAINE HYDROCHLORIDE 50 MG: 10 INJECTION, SOLUTION EPIDURAL; INFILTRATION; INTRACAUDAL; PERINEURAL at 13:32

## 2025-02-24 RX ADMIN — DEXAMETHASONE SODIUM PHOSPHATE 8 MG: 4 INJECTION INTRA-ARTICULAR; INTRALESIONAL; INTRAMUSCULAR; INTRAVENOUS; SOFT TISSUE at 13:32

## 2025-02-24 RX ADMIN — OXYCODONE HYDROCHLORIDE AND ACETAMINOPHEN 1 TABLET: 5; 325 TABLET ORAL at 16:00

## 2025-02-24 RX ADMIN — DEXMEDETOMIDINE HYDROCHLORIDE IN 0.9% SODIUM CHLORIDE 8 MCG: 4 INJECTION INTRAVENOUS at 13:57

## 2025-02-24 RX ADMIN — FAMOTIDINE 20 MG: 20 TABLET, FILM COATED ORAL at 11:47

## 2025-02-24 RX ADMIN — FENTANYL CITRATE 100 MCG: 50 INJECTION, SOLUTION INTRAMUSCULAR; INTRAVENOUS at 13:32

## 2025-02-24 NOTE — INTERVAL H&P NOTE
"[facility] Pre-op    Full history and physical note from office is attached.    /85 (BP Location: Right arm, Patient Position: Lying)   Pulse 80   Temp 98.1 °F (36.7 °C) (Temporal)   Resp 16   Ht 185.4 cm (73\")   Wt 104 kg (230 lb)   SpO2 98%   BMI 30.34 kg/m²     Review of Systems:  Constitutional-- No fever, chills or sweats. No fatigue.  CV-- No chest pain, palpitation or syncope  Resp-- No SOB, cough, hemoptysis  Skin--No rashes or lesions    Physical Exam:  Heart:   Regular rate and rhythm, S1 and S2 normal  Lungs: Clear to auscultation bilaterally, respirations unlabored    LAB Results:  Lab Results   Component Value Date    WBC 7.36 02/17/2025    HGB 14.9 02/17/2025    HCT 43.6 02/17/2025    MCV 89.7 02/17/2025     02/17/2025    NEUTROABS 4.99 02/17/2025    GLUCOSE 96 02/17/2025    BUN 13 02/17/2025    CREATININE 1.24 02/17/2025     02/17/2025    K 4.2 02/17/2025     02/17/2025    CO2 28.0 02/17/2025    CALCIUM 9.9 02/17/2025    ALBUMIN 4.6 02/17/2025    AST 23 02/17/2025    ALT 37 02/17/2025    BILITOT 1.7 (H) 02/17/2025       Cancer Staging (if applicable)  Cancer Patient: __ yes __no __unknown__N/A; If yes, clinical stage T:__ N:__M:__, stage group or __N/A      Impression: Left lumbar radiculopathy       Plan: LUMBAR DISCECTOMY L5-S1       Mindy Alvarez, APRN   2/24/2025   12:30 EST  "

## 2025-02-24 NOTE — ANESTHESIA PREPROCEDURE EVALUATION
Anesthesia Evaluation     Patient summary reviewed and Nursing notes reviewed   history of anesthetic complications:  PONV               Airway   Mallampati: I  TM distance: >3 FB  Neck ROM: full  No difficulty expected  Dental - normal exam     Pulmonary - negative pulmonary ROS and normal exam   Cardiovascular - negative cardio ROS and normal exam        Neuro/Psych  (+) numbness, psychiatric history Anxiety and Depression  GI/Hepatic/Renal/Endo - negative ROS     Musculoskeletal (-) negative ROS    Abdominal  - normal exam    Bowel sounds: normal.   Substance History - negative use     OB/GYN negative ob/gyn ROS         Other                    Anesthesia Plan    ASA 2     general     intravenous induction     Anesthetic plan, risks, benefits, and alternatives have been provided, discussed and informed consent has been obtained with: patient.    Plan discussed with CRNA.    CODE STATUS:

## 2025-02-24 NOTE — ANESTHESIA PROCEDURE NOTES
Airway  Urgency: elective    Date/Time: 2/24/2025 1:36 PM  Airway not difficult    General Information and Staff    Patient location during procedure: OR  CRNA/CAA: Lo Germain CRNA    Indications and Patient Condition  Indications for airway management: airway protection    Preoxygenated: yes  MILS not maintained throughout  Mask difficulty assessment: 1 - vent by mask    Final Airway Details  Final airway type: endotracheal airway      Successful airway: ETT  Cuffed: yes   Successful intubation technique: video laryngoscopy  Facilitating devices/methods: intubating stylet  Endotracheal tube insertion site: oral  Blade: Garcia  Blade size: 4  ETT size (mm): 8.0  Cormack-Lehane Classification: grade I - full view of glottis  Placement verified by: chest auscultation and capnometry   Inital cuff pressure (cm H2O): 8  Measured from: lips  ETT/EBT  to lips (cm): 22  Number of attempts at approach: 1  Assessment: lips, teeth, and gum same as pre-op and atraumatic intubation    Additional Comments  Negative epigastric sounds, Breath sound equal bilaterally with symmetric chest rise and fall. Patient has multiple chipped and missing teeth at baseline. Small laceration to left upper lip

## 2025-02-24 NOTE — ANESTHESIA POSTPROCEDURE EVALUATION
Patient: Hammad Pressley    Procedure Summary       Date: 02/24/25 Room / Location:  [facility] /  [facility]    Anesthesia Start: 1329 Anesthesia Stop: 1450    Procedure: Redo left L5-S1 discectomy (Left: Spine Lumbar) Diagnosis:       Left lumbar radiculopathy      (Left lumbar radiculopathy [M54.16])    Surgeons: Alberto Cali MD Provider: Kelvin Gutierrez MD    Anesthesia Type: general ASA Status: 2            Anesthesia Type: general    Vitals  Vitals Value Taken Time   BP     Temp     Pulse 79 02/24/25 1449   Resp     SpO2 95 % 02/24/25 1449   Vitals shown include unfiled device data.        Post Anesthesia Care and Evaluation    Patient location during evaluation: PACU  Patient participation: waiting for patient participation  Pain management: adequate    Airway patency: patent  Anesthetic complications: No anesthetic complications  PONV Status: none  Cardiovascular status: hemodynamically stable and acceptable  Respiratory status: nonlabored ventilation, acceptable, nasal cannula and oral airway  Hydration status: acceptable    Comments: /75  HR 79  Sats 96  Temp 98

## 2025-02-24 NOTE — OP NOTE
NEUROSURGICAL OPERATIVE NOTE        PREOPERATIVE DIAGNOSIS:    Recurrent left L5-S1 disc herniation      POSTOPERATIVE DIAGNOSIS:  Same      PROCEDURE:  Redo left L5-S1 discectomy      SURGEON:  Alberto Cali M.D.      ASSISTANT: Aileen Sosa PA-C    PAC assisted with:   Suctioning   Retraction   Tying   Suturing   Closing   Application of dressing   Skilled neurosurgery PA assistance was necessary to perform this procedure.        ANESTHESIA:  General      ESTIMATED BLOOD LOSS: Minimal      SPECIMEN: None      DRAINS: None      COMPLICATIONS:  None      CLINICAL NOTE:  The patient is a 34-year-old gentleman who in 2014 underwent left L5-S1 discectomy and has done well.  He has more recently developed severe back and recurrent left leg pain.  Studies demonstrate recurrent disc herniation on the left at L5-S1 that is felt to be the source for his symptoms and as such he presents at this time for redo lumbar discectomy.  The nature of the procedure as well as the potential risks, complications, limitations, and alternatives to the procedure were discussed at length with the patient and the patient has agreed to proceed with surgery.      TECHNICAL NOTE:  The patient was brought to the operating room and while on his cart general endotracheal anesthesia was achieved. He was then turned prone onto the Cloward saddle frame and special care was ensured to protect pressure points. His low back was prepared and draped in the usual fashion. A localizing radiograph was obtained with a spinal needle in the lumbosacral midline. Based on this, a 2.5 to 3 cm vertical incision was opened just adjacent to the prior incision. Underlying tissues were divided with cautery to provide exposure to the left L5 and S1 hemilamina. Laminotomy was extended upward. The medial aspect of the facet was taken down with drill and Kerrison punches. Extensive granulation tissue was taken down. The S1 nerve root was displaced dorsally. I  worked a bit in the axilla and then more laterally to mobilize the nerve root and dural sac. I entered the disc space and an array of pituitary rongeurs was utilized to remove friable disc material. Using an upbiting pituitary rongeur, I was able to remove some large, more centrally located disc fragments. Ultimately good decompression of the thecal sac and nerve root was accomplished. I could much more easily retract the nerve root after the decompression. The wound was washed out with a saline solution. A small portion of Gelfoam soaked in Depo-Medrol was placed over the exposed dura and nerve root. The paraspinous muscle and fascia were reapproximated in an interrupted fashion with 0 Vicryl suture; 0.25% Marcaine was instilled in the paraspinous musculature and subcutaneous tissues. Subcutaneous tissues were closed in layers with 3-0 Vicryl suture. Skin was closed in a running subcuticular fashion with 3-0 Vicryl suture. A dermal sealant and a sterile dressing were applied. He was rolled onto his cart, extubated and taken to the recovery room in satisfactory condition.             Alberto Cali M.D.

## 2025-03-03 ENCOUNTER — TELEPHONE (OUTPATIENT)
Dept: FAMILY MEDICINE CLINIC | Facility: CLINIC | Age: 35
End: 2025-03-03
Payer: COMMERCIAL

## 2025-03-11 RX ORDER — GLYCOPYRROLATE 1 MG/1
1 TABLET ORAL 2 TIMES DAILY
Qty: 180 TABLET | Refills: 1 | Status: SHIPPED | OUTPATIENT
Start: 2025-03-11

## 2025-03-18 ENCOUNTER — OFFICE VISIT (OUTPATIENT)
Dept: NEUROSURGERY | Facility: CLINIC | Age: 35
End: 2025-03-18
Payer: OTHER MISCELLANEOUS

## 2025-03-18 VITALS
HEIGHT: 73 IN | DIASTOLIC BLOOD PRESSURE: 78 MMHG | WEIGHT: 237 LBS | BODY MASS INDEX: 31.41 KG/M2 | TEMPERATURE: 98.2 F | SYSTOLIC BLOOD PRESSURE: 120 MMHG

## 2025-03-18 DIAGNOSIS — M51.362 DEGENERATION OF INTERVERTEBRAL DISC OF LUMBAR REGION WITH DISCOGENIC BACK PAIN AND LOWER EXTREMITY PAIN: ICD-10-CM

## 2025-03-18 DIAGNOSIS — M54.16 LEFT LUMBAR RADICULOPATHY: Primary | ICD-10-CM

## 2025-03-18 DIAGNOSIS — Z98.890 S/P DISCECTOMY: ICD-10-CM

## 2025-03-18 PROCEDURE — 99024 POSTOP FOLLOW-UP VISIT: CPT | Performed by: PHYSICIAN ASSISTANT

## 2025-04-19 ENCOUNTER — OFFICE VISIT (OUTPATIENT)
Dept: NEUROSURGERY | Facility: CLINIC | Age: 35
End: 2025-04-19
Payer: OTHER MISCELLANEOUS

## 2025-04-19 VITALS — TEMPERATURE: 97.8 F | BODY MASS INDEX: 30.88 KG/M2 | WEIGHT: 233 LBS | HEIGHT: 73 IN

## 2025-04-19 DIAGNOSIS — M51.16 LUMBAR DISC HERNIATION WITH RADICULOPATHY: ICD-10-CM

## 2025-04-19 DIAGNOSIS — Z98.890 S/P LUMBAR DISCECTOMY: Primary | ICD-10-CM

## 2025-04-19 NOTE — PROGRESS NOTES
Patient: Hammad Pressley  : 1990    Primary Care Provider: Aylin Monique APRN    Requesting Provider: As above        History    Chief Complaint: Low back and left leg pain.    History of Present Illness: Mr. 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  In  he underwent left L5-S1 discectomy and did well.  More recently, he reinjured his back while working with a horse.  Preoperative studies demonstrated recurrent disc herniation on the left at L5-S1 felt to be source for his symptoms.  On 2025 he underwent redo discectomy at L5-S1.  Is done great.  His leg pain is absent.  He has not yet returned to work.  He recently survived a trip to [place].    Review of Systems   Constitutional:  Negative for activity change, appetite change, chills, diaphoresis, fatigue, fever and unexpected weight change.   HENT:  Negative for congestion, dental problem, drooling, ear discharge, ear pain, facial swelling, hearing loss, mouth sores, nosebleeds, postnasal drip, rhinorrhea, sinus pressure, sinus pain, sneezing, sore throat, tinnitus, trouble swallowing and voice change.    Eyes:  Negative for photophobia, pain, discharge, redness, itching and visual disturbance.   Respiratory:  Negative for apnea, cough, choking, chest tightness, shortness of breath, wheezing and stridor.    Cardiovascular:  Negative for chest pain, palpitations and leg swelling.   Gastrointestinal:  Negative for abdominal distention, abdominal pain, anal bleeding, blood in stool, constipation, diarrhea, nausea, rectal pain and vomiting.   Endocrine: Negative for cold intolerance, heat intolerance, polydipsia, polyphagia and polyuria.   Genitourinary:  Negative for decreased urine volume, difficulty urinating, dysuria, enuresis, flank pain, frequency, genital sores, hematuria and urgency.   Musculoskeletal:  Negative for arthralgias, back pain, gait problem, joint swelling, myalgias, neck  "pain and neck stiffness.   Skin:  Negative for color change, pallor, rash and wound.   Allergic/Immunologic: Negative for environmental allergies, food allergies and immunocompromised state.   Neurological:  Negative for dizziness, tremors, seizures, syncope, facial asymmetry, speech difficulty, weakness, light-headedness, numbness and headaches.   Hematological:  Negative for adenopathy. Does not bruise/bleed easily.   Psychiatric/Behavioral:  Negative for agitation, behavioral problems, confusion, decreased concentration, dysphoric mood, hallucinations, self-injury, sleep disturbance and suicidal ideas. The patient is not nervous/anxious and is not hyperactive.    All other systems reviewed and are negative.    The patient's past medical history, past surgical history, family history, and social history have been reviewed at length in the electronic medical record.      Physical Exam:   Temp 97.8 °F (36.6 °C) (Infrared)   Ht 185.4 cm (73\")   Wt 106 kg (233 lb)   BMI 30.74 kg/m²   Her incision looks good.    Medical Decision Making      Diagnosis:   Recurrent lumbar herniation status post discectomy.    Treatment Options:   Mr. Pressley is doing well.  Cleared to return to work.  He will escalate his activities on a graded basis.  He will follow-up in our clinic in approximately 2 months for what will likely be a final visit.  A final visit      Scribed for Alberto Cali MD by Shy Day CMA on 4/19/2025 11:02 EDT       I, Dr. Cali, personally performed the services described in the documentation, as scribed in my presence, and it is both accurate and complete.  "

## 2025-05-16 ENCOUNTER — OFFICE VISIT (OUTPATIENT)
Dept: FAMILY MEDICINE CLINIC | Facility: CLINIC | Age: 35
End: 2025-05-16
Payer: COMMERCIAL

## 2025-05-16 VITALS
TEMPERATURE: 98.4 F | BODY MASS INDEX: 30.75 KG/M2 | DIASTOLIC BLOOD PRESSURE: 72 MMHG | HEART RATE: 75 BPM | OXYGEN SATURATION: 98 % | RESPIRATION RATE: 18 BRPM | WEIGHT: 232 LBS | SYSTOLIC BLOOD PRESSURE: 116 MMHG | HEIGHT: 73 IN

## 2025-05-16 DIAGNOSIS — F41.9 ANXIETY AND DEPRESSION: Primary | ICD-10-CM

## 2025-05-16 DIAGNOSIS — F32.A ANXIETY AND DEPRESSION: Primary | ICD-10-CM

## 2025-05-16 DIAGNOSIS — Z98.890 S/P DISCECTOMY: ICD-10-CM

## 2025-05-16 RX ORDER — BUPROPION HYDROCHLORIDE 300 MG/1
300 TABLET ORAL DAILY
Qty: 90 TABLET | Refills: 2 | Status: SHIPPED | OUTPATIENT
Start: 2025-05-16

## 2025-05-16 NOTE — ASSESSMENT & PLAN NOTE
Patient has longstanding pattern of both anxiety and depression.  Patient has a pattern of doing well with initial medication changes, however a few months after being on particular regimens the efficacy and will quickly wane.  Approximately 9 months ago patient required hospitalization for 4 days at [facility] for suicidal ideation.  At that time all of his psychiatric medicines were discontinued.  Upon return to my office patient was started on Wellbutrin  mg daily as well as Vraylar 1.5 mg daily.  Patient never followed up as outpatient with behavioral health.  Patient states today that over the last month he has been experiencing severe depressive symptoms.  He states he finds no linda or happiness in anything at all, would prefer to just lay in bed all day.  He denies any recurrence of SI or HI.  Patient denies any sleep disturbance, alteration in appetite.  Discussed with patient that difficulty in keeping his mood stable would benefit from referral to behavioral health for which patient is agreeable.  Will increase his Wellbutrin XL from 150 to 300 mg daily and increase Vraylar from 1.5 to 3 mg daily.  I have also placed a referral to our behavioral health APRN, Paul Jean to evaluate and pursue medication management.

## 2025-05-16 NOTE — ASSESSMENT & PLAN NOTE
Patient is also recovering from his second discectomy.  Patient initially underwent L5-S1 discectomy in 2014 under the care of Dr. Cali.  In recent months patient developed severe back and recurrent left leg pain.  Repeat studies showed recurrent disc herniation on the left at L5-S1 that was felt to be the source of his symptoms.  Sequently he underwent redo lumbar discectomy in February 2025.

## 2025-05-16 NOTE — PROGRESS NOTES
Office Note     Name: Hammad Pressley    : 1990     MRN: [id number]     Chief Complaint  Patient states that anxiety medicines are not work now    Subjective     History of Present Illness:  Hammad Pressley is a 34 y.o. male who presents today for complaints of worsening depression.  Patient has longstanding pattern of both anxiety and depression.  Patient has a pattern of doing well with initial medication changes, however a few months after being on particular regimens the efficacy and will quickly wane.  Approximately 9 months ago patient required hospitalization for 4 days at [facility] for suicidal ideation.  At that time all of his psychiatric medicines were discontinued.  Upon return to my office patient was started on Wellbutrin  mg daily as well as Vraylar 1.5 mg daily.  Patient never followed up as outpatient with behavioral health.  Patient states today that over the last month he has been experiencing severe depressive symptoms.  He states he finds no linda or happiness in anything at all, would prefer to just lay in bed all day.  He denies any recurrence of SI or HI.  Patient denies any sleep disturbance, alteration in appetite.  Patient is also recovering from his second discectomy.  Patient initially underwent L5-S1 discectomy in  under the care of Dr. Cali.  In recent months patient developed severe back and recurrent left leg pain.  Repeat studies showed recurrent disc herniation on the left at L5-S1 that was felt to be the source of his symptoms.  Sequently he underwent redo lumbar discectomy in 2025.  Patient states his pain has now completely resolved and is noncontributory to his mood.  His blood pressure is excellent in office today, 116/72.  He has no further complaints or concerns    Review of Systems   Constitutional:  Positive for diaphoresis. Negative for appetite change, fatigue and fever.   Respiratory:  Negative for shortness of  "breath.    Cardiovascular:  Negative for chest pain and palpitations.   Gastrointestinal:  Positive for nausea.   Neurological:  Negative for dizziness and confusion.   Psychiatric/Behavioral:  Positive for depressed mood. Negative for self-injury, sleep disturbance and suicidal ideas.        Objective     Past Medical History:   Diagnosis Date    Anxiety     Fracture of wrist 10/12/23    Hyperhidrosis     PONV (postoperative nausea and vomiting)     after shoulder surgery    Wears glasses      Past Surgical History:   Procedure Laterality Date    BACK SURGERY      FRACTURE SURGERY      LUMBAR DISCECTOMY Left 2/24/2025    Procedure: DISCECTOMY REDO LEFT L5-S1;  Surgeon: Alberto Cali MD;  Location: [facility];  Service: Neurosurgery;  Laterality: Left;    SHOULDER SURGERY Right     Rotator cuff repair & dislocation repair    TONSILLECTOMY      WRIST SURGERY Left 11/01/2023    Left scaphoid open reduction internal fixation -Dr. Napoles     History reviewed. No pertinent family history.    Vital Signs  /72 (BP Location: Left arm, Patient Position: Sitting, Cuff Size: Adult)   Pulse 75   Temp 98.4 °F (36.9 °C) (Temporal)   Resp 18   Ht 185.4 cm (73\")   Wt 105 kg (232 lb)   SpO2 98%   BMI 30.61 kg/m²   Estimated body mass index is 30.61 kg/m² as calculated from the following:    Height as of this encounter: 185.4 cm (73\").    Weight as of this encounter: 105 kg (232 lb).  Facility age limit for growth %joceline is 20 years.    Physical Exam  Vitals reviewed.   Constitutional:       Appearance: Normal appearance.   Eyes:      Conjunctiva/sclera: Conjunctivae normal.   Cardiovascular:      Rate and Rhythm: Normal rate and regular rhythm.      Pulses: Normal pulses.      Heart sounds: Normal heart sounds.   Pulmonary:      Effort: Pulmonary effort is normal.      Breath sounds: Normal breath sounds.   Abdominal:      General: Bowel sounds are normal.   Musculoskeletal:      Cervical back: Neck supple. "   Skin:     General: Skin is warm and dry.   Neurological:      Mental Status: He is alert and oriented to person, place, and time.   Psychiatric:         Attention and Perception: Attention normal.         Mood and Affect: Affect is flat.         Speech: Speech normal.         Behavior: Behavior normal. Behavior is cooperative.         Thought Content: Thought content normal.         Cognition and Memory: Cognition normal.         Judgment: Judgment normal.          POCT Results (if applicable):  Results for orders placed or performed in visit on 02/17/25   MRSA Screen Culture (Outpatient) - Swab, Nares    Collection Time: 02/17/25  9:44 AM    Specimen: Nares; Swab   Result Value Ref Range    MRSA Screen Cx Staphylococcus aureus, MRSA (A)    Comprehensive Metabolic Panel    Collection Time: 02/17/25  9:44 AM    Specimen: Blood   Result Value Ref Range    Glucose 96 65 - 99 mg/dL    BUN 13 6 - 20 mg/dL    Creatinine 1.24 0.76 - 1.27 mg/dL    Sodium 140 136 - 145 mmol/L    Potassium 4.2 3.5 - 5.2 mmol/L    Chloride 102 98 - 107 mmol/L    CO2 28.0 22.0 - 29.0 mmol/L    Calcium 9.9 8.6 - 10.5 mg/dL    Total Protein 7.3 6.0 - 8.5 g/dL    Albumin 4.6 3.5 - 5.2 g/dL    ALT (SGPT) 37 1 - 41 U/L    AST (SGOT) 23 1 - 40 U/L    Alkaline Phosphatase 65 39 - 117 U/L    Total Bilirubin 1.7 (H) 0.0 - 1.2 mg/dL    Globulin 2.7 gm/dL    A/G Ratio 1.7 g/dL    BUN/Creatinine Ratio 10.5 7.0 - 25.0    Anion Gap 10.0 5.0 - 15.0 mmol/L    eGFR 78.2 >60.0 mL/min/1.73   CBC Auto Differential    Collection Time: 02/17/25  9:44 AM    Specimen: Blood   Result Value Ref Range    WBC 7.36 3.40 - 10.80 10*3/mm3    RBC 4.86 4.14 - 5.80 10*6/mm3    Hemoglobin 14.9 13.0 - 17.7 g/dL    Hematocrit 43.6 37.5 - 51.0 %    MCV 89.7 79.0 - 97.0 fL    MCH 30.7 26.6 - 33.0 pg    MCHC 34.2 31.5 - 35.7 g/dL    RDW 12.3 12.3 - 15.4 %    RDW-SD 40.1 37.0 - 54.0 fl    MPV 9.4 6.0 - 12.0 fL    Platelets 225 140 - 450 10*3/mm3    Neutrophil % 67.7 42.7 - 76.0 %     Lymphocyte % 21.5 19.6 - 45.3 %    Monocyte % 7.9 5.0 - 12.0 %    Eosinophil % 1.8 0.3 - 6.2 %    Basophil % 0.7 0.0 - 1.5 %    Immature Grans % 0.4 0.0 - 0.5 %    Neutrophils, Absolute 4.99 1.70 - 7.00 10*3/mm3    Lymphocytes, Absolute 1.58 0.70 - 3.10 10*3/mm3    Monocytes, Absolute 0.58 0.10 - 0.90 10*3/mm3    Eosinophils, Absolute 0.13 0.00 - 0.40 10*3/mm3    Basophils, Absolute 0.05 0.00 - 0.20 10*3/mm3    Immature Grans, Absolute 0.03 0.00 - 0.05 10*3/mm3    nRBC 0.0 0.0 - 0.2 /100 WBC            Assessment and Plan     Diagnoses and all orders for this visit:    1. Anxiety and depression (Primary)  Assessment & Plan:   Patient has longstanding pattern of both anxiety and depression.  Patient has a pattern of doing well with initial medication changes, however a few months after being on particular regimens the efficacy and will quickly wane.  Approximately 9 months ago patient required hospitalization for 4 days at [facility] for suicidal ideation.  At that time all of his psychiatric medicines were discontinued.  Upon return to my office patient was started on Wellbutrin  mg daily as well as Vraylar 1.5 mg daily.  Patient never followed up as outpatient with behavioral health.  Patient states today that over the last month he has been experiencing severe depressive symptoms.  He states he finds no linda or happiness in anything at all, would prefer to just lay in bed all day.  He denies any recurrence of SI or HI.  Patient denies any sleep disturbance, alteration in appetite.  Discussed with patient that difficulty in keeping his mood stable would benefit from referral to behavioral health for which patient is agreeable.  Will increase his Wellbutrin XL from 150 to 300 mg daily and increase Vraylar from 1.5 to 3 mg daily.  I have also placed a referral to our behavioral health APRN, Paul Jean to evaluate and pursue medication management.    Orders:  -     buPROPion XL (Wellbutrin XL)  300 MG 24 hr tablet; Take 1 tablet by mouth Daily.  Dispense: 90 tablet; Refill: 2  -     Cariprazine HCl (Vraylar) 3 MG capsule capsule; Take 1 capsule by mouth Daily.  Dispense: 90 capsule; Refill: 0  -     Ambulatory Referral to Behavioral Health    2. S/P discectomy  Assessment & Plan:  Patient is also recovering from his second discectomy.  Patient initially underwent L5-S1 discectomy in 2014 under the care of Dr. Cali.  In recent months patient developed severe back and recurrent left leg pain.  Repeat studies showed recurrent disc herniation on the left at L5-S1 that was felt to be the source of his symptoms.  Sequently he underwent redo lumbar discectomy in February 2025.              Follow Up  No follow-ups on file.    Aylin Monique, APRN

## 2025-05-22 ENCOUNTER — OFFICE VISIT (OUTPATIENT)
Age: 35
End: 2025-05-22
Payer: COMMERCIAL

## 2025-05-22 VITALS — HEIGHT: 73 IN | WEIGHT: 232 LBS | BODY MASS INDEX: 30.75 KG/M2

## 2025-05-22 DIAGNOSIS — F33.1 MODERATE EPISODE OF RECURRENT MAJOR DEPRESSIVE DISORDER: Primary | ICD-10-CM

## 2025-05-22 PROBLEM — F33.9 RECURRENT MAJOR DEPRESSIVE DISORDER: Status: ACTIVE | Noted: 2025-05-22

## 2025-05-22 PROBLEM — F32.1 CURRENT MODERATE EPISODE OF MAJOR DEPRESSIVE DISORDER WITHOUT PRIOR EPISODE: Status: ACTIVE | Noted: 2025-05-22

## 2025-05-22 NOTE — PROGRESS NOTES
New Patient Office Visit      Patient Name: Hammad Pressley  : 1990   MRN: [id number]     Referring Provider: Aylin Monique APRN    Chief Complaint:  Psychiatric evaluation related to:     ICD-10-CM ICD-9-CM   1. Moderate episode of recurrent major depressive disorder  F33.1 296.32        History of Present Illness:   Hammad Pressley is a 34 y.o. male who is here today for initial psychiatric evaluation. He presents with complaints of depression. He says he no longer finds happiness or linda in anything anymore, feels tired all the time, and feels like he is just going through the motions in life and work, which he used to enjoy. He says the symptoms got worse this previous November. At that time he began to experience suicidal ideation and had himself hospitalized for this. He never had a plan at the time, but just the thoughts. He denies any current SI, or any SI since the hospitalization.     He has had back issues for a while now that he has been dealing with after an injury sustained at work. He does endorse that this decreased his enjoyment of life, but he had his second back surgery in February and feels much better in terms of this now. He does not feel like the back issue is contributing to the depression anymore.     He only endorses mild anxiety. Denies hallucinations. Denies any substance use. Denies any past trauma and endorses a normal childhood. Endorses getting adequate sleep.     He has been taking bupropion  mg for over 2 years and Vraylar 1.5 mg for 1 to 2 years. He feels like these medications were helpful when he began them, but feels they have stopped being effective for a while. His medications were just increased last week by his PCP to bupropion  mg and Vraylar 3 mg. He had previously trialed Lexapro, which he feels made his depression worse.      Subjective     PHQ-9 Depression Screening Score: PHQ-9 Total Score: 10 for mild depression  EULA-7 Anxiety  Screening Score: 6 for mild anxiety  Mood Disorder Questionnaire Screening Score: 1 (negative for bipolar)    Psychiatric Review of Systems:   Mood: Depressed  Anxiety: Denies  Delisa: Denies  Psychosis: Denies    Work History:   Patient's Occupation: Works caring for horses    Interpersonal/Relational:  Marital Status:   Family Structure: Has been  for 14 years. Has 2 kids aged 12 and 9. Endorses a supportive family.  Support system:  Family  Hobbies: Enjoys fishing    Psychiatric History:   Medication: Currently on bupropion  mg and Vraylar 3 mg. Previous unsuccessful trial of Lexapro.  Hospitalization: One hospitalization this past November for SI  Counseling/Therapy: Denies  Seizures: Denies  Suicide Attempts: Denies  Suicidal Ideation: Currently denies. Experienced SI in November for which he was hospitalized.  Self-injurious behavior: Denies    History of Substance Use/Abuse:   Alcohol: Denies  Drugs: Denies  Tobacco: Denies    Family Psychiatric History:  Not aware of any family psychiatric history    Significant Life Events:   Has patient experienced any form of verbal, physical, emotional, or sexual abuse? no  Has patient experienced a death / loss of relationship? no  Has patient experienced a major accident or tragic events? no    Social History:   Social History     Socioeconomic History    Marital status:    Tobacco Use    Smoking status: Never     Passive exposure: Never    Smokeless tobacco: Never   Vaping Use    Vaping status: Never Used   Substance and Sexual Activity    Alcohol use: Not Currently     Alcohol/week: 1.0 standard drink of alcohol    Drug use: Never    Sexual activity: Defer        Past Medical History:   Past Medical History:   Diagnosis Date    Anxiety     Depression     Fracture of wrist 10/12/23    Hyperhidrosis     PONV (postoperative nausea and vomiting)     after shoulder surgery    Wears glasses        Past Surgical History:   Past Surgical History:  "  Procedure Laterality Date    BACK SURGERY      FRACTURE SURGERY      LUMBAR DISCECTOMY Left 2/24/2025    Procedure: DISCECTOMY REDO LEFT L5-S1;  Surgeon: Alberto Cali MD;  Location: [facility];  Service: Neurosurgery;  Laterality: Left;    SHOULDER SURGERY Right     Rotator cuff repair & dislocation repair    TONSILLECTOMY      WRIST SURGERY Left 11/01/2023    Left scaphoid open reduction internal fixation -Dr. Napoles       Family History: History reviewed. No pertinent family history.    Medications:     Current Outpatient Medications:     buPROPion XL (Wellbutrin XL) 300 MG 24 hr tablet, Take 1 tablet by mouth Daily., Disp: 90 tablet, Rfl: 2    Cariprazine HCl (Vraylar) 3 MG capsule capsule, Take 1 capsule by mouth Daily., Disp: 90 capsule, Rfl: 0    glycopyrrolate (ROBINUL) 1 MG tablet, Take 1 tablet by mouth 2 (Two) Times a Day., Disp: 180 tablet, Rfl: 1    Allergies:   No Known Allergies      Objective     Physical Exam:  Vital Signs:   Vitals:    05/22/25 0908   Weight: 105 kg (232 lb)   Height: 185.4 cm (73\")     Body mass index is 30.61 kg/m².     Mental Status Exam:   Hygiene:   good  Cooperation:  Cooperative  Eye Contact:  Good  Psychomotor Behavior:  Appropriate  Affect:  Appropriate  Mood: depressed  Speech:  Normal  Thought Process:  Goal directed and Linear  Thought Content:  Normal  Suicidal:  None  Homicidal:  None  Hallucinations:  None  Delusion:  None  Memory:  Intact  Orientation:  Person, Place, Time, and Situation  Reliability:  good  Insight:  Good  Judgement:  Good  Impulse Control:  Good  Physical/Medical Issues:  Yes recent back surgery      SUICIDE RISK ASSESSMENT/CSSRS:  1. Does patient have thoughts of suicide? no  2. Does patient have intent for suicide? no  3. Does patient have a current plan for suicide? no  4. History of suicide attempts: no  5. Family history of suicide or attempts: no  6. History of thoughts of committing suicide: yes  7. History of violent behavior " toward others: no  8. Access to firearms or weapons: no    Assessment / Plan      Visit Diagnosis/Orders Placed This Visit:  Diagnoses and all orders for this visit:    1. Moderate episode of recurrent major depressive disorder (Primary)           PLAN:  Safety: No acute safety concerns  Risk Assessment: Risk of self-harm acutely is low. Risk of self-harm chronically is also low, but could be further elevated in the event of treatment noncompliance.    Treatment Plan/Goals: Considering his medications were just titrated up last week we will continue that regimen at this time: Bupropion  mg and Vraylar 3 mg. I told him that we will need to give these medications a few more weeks before we are able to  if they are effective or not. I told him if they are not effective then we will look into alternative medications during our next visit. I also advised him to give some thought about where he wants his life to go from here, and make some goals pertaining to this. He seems to be struggling with direction and purpose in his life so I think this will be helpful for him. I will follow up with the patient in 1 month to assess how he is doing.    Continue supportive psychotherapy efforts and medications as indicated. Treatment and medication options discussed during today's visit. Patient ackowledged and verbally consented to continue with current treatment plan and was educated on the importance of compliance with treatment and follow-up appointments. Patient seems reasonably able to adhere to treatment plan.      Provided a safe, confidential environment to facilitate the development of a positive therapeutic relationship and encouraged open, honest communication. Assisted Patient in identifying risk factors which would indicate the need for higher level of care including thoughts to harm self or others and/or self-harming behavior and encouraged patient to contact this office, call 911, or present to the nearest  emergency room should any of these events occur. Discussed crisis intervention services and means to access.      Quality Measures:   Never smoker      Follow Up:   Return in about 4 weeks (around 6/19/2025).      Paul Jean, APRN

## 2025-06-04 NOTE — PROGRESS NOTES
[facility]     Office Visit       Date: 04/02/2024   Patient Name: Hammad Pressley  MRN: [id number]  YOB: 1990    Referring Physician: No ref. provider found     Chief Complaint:   Chief Complaint   Patient presents with    Follow-up     2 month follow up; 5 months status post Left scaphoid open reduction internal fixation - 11/1/23     History of Present Illness:   Hammad Pressley is a 33 y.o. male status post left scaphoid ORIF, DOS 11/1/2023.  At his last clinic visit he was provided a prescription for formal physical therapy.  He did not attend formal therapy but has been using the wrist at work and at home.  He reports improvements in his pain and range of motion.  Pain today is 0/10.  He has no longer been using the brace.  He has returned to all of his activities without restriction.  He states his  strength is approximately 85% that of the contralateral side.  Otherwise no other complaints or concerns.    Subjective   Review of Systems:   Review of Systems   Constitutional: Negative.    HENT: Negative.     Eyes: Negative.    Respiratory: Negative.     Cardiovascular: Negative.    Gastrointestinal: Negative.    Endocrine: Negative.    Genitourinary: Negative.    Musculoskeletal:  Positive for arthralgias.   Skin: Negative.    Allergic/Immunologic: Negative.    Neurological: Negative.    Hematological: Negative.    Psychiatric/Behavioral: Negative.        Pertinent review of systems per HPI    I reviewed the patient's chief complaint, history of present illness, review of systems, past medical history, surgical history, family history, social history, medications and allergy list in the EMR on 04/02/2024 and agree with the findings above.    Objective    Quality Measures:   ACP:   ACP discussion was declined by the patient.      Tobacco:   Hammad Pressley  reports that he has never smoked. He has never used  Farideh here for Fluorouracil Pump disconnect.     Patient denies any issues or concerns with pump prior to disconnection. Elastomeric pump appears to be deflated and empty upon disconnect.   Patient denies any toxicities at this time.     Central line care provided per [facility] policy and procedure. See MAR and [facility] for full details.     Labs drawn this visit? N/A    Neulasta on-body injector: No:  See Education note for Neulasta teaching completed with patient.     Discharged: Stable.     "smokeless tobacco.     Vital Signs:   Vitals:    04/02/24 0927   BP: 128/96   Weight: 101 kg (223 lb 11.2 oz)   Height: 185.4 cm (72.99\")     BMI:      General: No acute distress. Alert and oriented.     Ortho Exam:  Examination of the left upper extremity demonstrates a well-healed dorsal surgical incision.  This is nontender to palpation.  He is nontender along the SL interval.  Patient achieves 45 degrees of wrist extension and 60 degrees of wrist flexion.  Full pronation and supination.  He is able make composite fist and oppose thumb to small finger.  Sensation is intact to light touch throughout the hand.  Warm well-perfused distally.    Imaging / Studies:    Imaging Results (Last 24 Hours)       ** No results found for the last 24 hours. **          Assessment / Plan    Assessment/Plan:   Hammad Pressley is a 33 y.o. male status post left scaphoid ORIF, DOS 11/1/2023.      The patient has done well since his last clinic visit.  He has returned to all of his activities without brace wear or restriction.  He feels his range of motion has improved.  He may continue all of his activities.  I will see him back for final x-rays in 6 months.  All questions and concerns were addressed.  He was agreeable with the plan.      ICD-10-CM ICD-9-CM   1. Displaced fracture of middle third of navicular (scaphoid) bone of left wrist, initial encounter for closed fracture  S62.022A 814.01     Follow Up:   Return in about 6 months (around 10/2/2024) for Follow Up.      Brianne Napoles MD  [facility]   "

## 2025-06-30 RX ORDER — GLYCOPYRROLATE 1 MG/1
1 TABLET ORAL 2 TIMES DAILY
Qty: 180 TABLET | Refills: 1 | Status: SHIPPED | OUTPATIENT
Start: 2025-06-30

## 2025-07-25 ENCOUNTER — OFFICE VISIT (OUTPATIENT)
Dept: FAMILY MEDICINE CLINIC | Facility: CLINIC | Age: 35
End: 2025-07-25
Payer: COMMERCIAL

## 2025-07-25 VITALS
TEMPERATURE: 98.4 F | BODY MASS INDEX: 29.82 KG/M2 | HEART RATE: 88 BPM | DIASTOLIC BLOOD PRESSURE: 70 MMHG | OXYGEN SATURATION: 98 % | WEIGHT: 225 LBS | HEIGHT: 73 IN | RESPIRATION RATE: 16 BRPM | SYSTOLIC BLOOD PRESSURE: 116 MMHG

## 2025-07-25 DIAGNOSIS — F34.1 PERSISTENT DEPRESSIVE DISORDER: ICD-10-CM

## 2025-07-25 DIAGNOSIS — F41.9 ANXIETY AND DEPRESSION: Primary | ICD-10-CM

## 2025-07-25 DIAGNOSIS — F32.A ANXIETY AND DEPRESSION: Primary | ICD-10-CM

## 2025-07-25 DIAGNOSIS — F41.1 GENERALIZED ANXIETY DISORDER: ICD-10-CM

## 2025-07-25 RX ORDER — HYDROXYZINE HYDROCHLORIDE 25 MG/1
25 TABLET, FILM COATED ORAL 3 TIMES DAILY PRN
Qty: 30 TABLET | Refills: 1 | Status: SHIPPED | OUTPATIENT
Start: 2025-07-25

## 2025-07-25 NOTE — PROGRESS NOTES
She    Office Note     Name: Hammad Pressley    : 1990     MRN: [id number]     Chief Complaint  ER follow up    Subjective     History of Present Illness:  Hammad Pressley is a 35 y.o. male who presents today for follow-up visit after being evaluated in ER for chest pain two days ago. Patient verbalizes developing left sided chest pain while at work. Patient is a  at [employer]. Patient describes the pain as a tightness and feeling like a heavy weight was on his chest. Patient also endorsed discomfort with deep inspiration. Other symptoms included feelings of shortness of breath and light headedness. Patient was given nitroglycerin in ER which he feels helped very minimally. He was also given morphine, toradol, a muscle relaxant, naproxen and prednisone. Patient was given diagnosis of pleurisy in ER. In our visit today patient is accompanied by his wife who is concerned regarding his longstanding pattern of anxiety and depression. She states over the last month he has done nothing but sleep. He has been very withdrawn. Patient has longstanding pattern of both anxiety and depression. Patient has a pattern of doing well with initial medication changes, however a few months after being on particular regimens the efficacy and will quickly wane. Approximately 9 months ago patient required hospitalization for 4 days at [facility] for suicidal ideation. At that time all of his psychiatric medicines were discontinued. Upon return to my office patient was started on Wellbutrin  mg daily as well as Vraylar 1.5 mg daily. Patient never followed up as outpatient with behavioral health. Patient states today that over the last month he has been experiencing severe depressive symptoms. He states he finds no linda or happiness in anything at all, would prefer to just lay in bed all day. 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 He denies any recurrence of SI or HI. During our last visit two months ago I discussed with the patient that given we were having such difficulty keeping his mood stable he would benefit from referral to behavioral health, however he failed to keep that appointment.  During that visit his Wellbutrin XL was also increased from 150 to 300 mg daily and Vraylar increased from 1.5 to 3 mg daily.   Review of Systems   Constitutional:  Negative for chills, fatigue and fever.   Respiratory:  Negative for cough, chest tightness, shortness of breath and wheezing.    Neurological:  Positive for light-headedness. Negative for dizziness, weakness, numbness and headache.   Psychiatric/Behavioral:  Positive for agitation, sleep disturbance, depressed mood and stress. Negative for self-injury and suicidal ideas. The patient is nervous/anxious.        Objective     Past Medical History:   Diagnosis Date    Anxiety     Depression     Fracture of wrist 10/12/23    Hyperhidrosis     PONV (postoperative nausea and vomiting)     after shoulder surgery    Wears glasses      Past Surgical History:   Procedure Laterality Date    BACK SURGERY      FRACTURE SURGERY      LUMBAR DISCECTOMY Left 2/24/2025    Procedure: DISCECTOMY REDO LEFT L5-S1;  Surgeon: Alberto Cali MD;  Location: [facility];  Service: Neurosurgery;  Laterality: Left;    SHOULDER SURGERY Right     Rotator cuff repair & dislocation repair    TONSILLECTOMY      WRIST SURGERY Left 11/01/2023    Left scaphoid open reduction internal fixation -Dr. Napoles     History reviewed. No pertinent family history.    Vital Signs  /70 (BP Location: Left arm, Patient Position: Sitting, Cuff Size: Adult)   Pulse 88   Temp 98.4 °F (36.9 °C) (Temporal)   Resp 16   Ht 185.4 cm (73\")   Wt 102 kg (225 lb)   SpO2 98%   BMI 29.69 kg/m²   Estimated body mass index is 29.69 kg/m² as calculated " "from the following:    Height as of this encounter: 185.4 cm (73\").    Weight as of this encounter: 102 kg (225 lb).  Facility age limit for growth %joceline is 20 years.    Physical Exam  Vitals reviewed.   Cardiovascular:      Rate and Rhythm: Normal rate and regular rhythm.      Pulses: Normal pulses.      Heart sounds: Normal heart sounds.   Pulmonary:      Effort: Pulmonary effort is normal.      Breath sounds: Normal breath sounds.   Musculoskeletal:      Cervical back: Neck supple.   Skin:     General: Skin is warm and dry.   Neurological:      Mental Status: He is alert and oriented to person, place, and time.   Psychiatric:         Mood and Affect: Mood is depressed. Affect is flat.         Speech: Speech normal.         Behavior: Behavior is cooperative.             POCT Results (if applicable):  Results for orders placed or performed in visit on 02/17/25   MRSA Screen Culture (Outpatient) - Swab, Nares    Collection Time: 02/17/25  9:44 AM    Specimen: Nares; Swab   Result Value Ref Range    MRSA Screen Cx Staphylococcus aureus, MRSA (A)    Comprehensive Metabolic Panel    Collection Time: 02/17/25  9:44 AM    Specimen: Blood   Result Value Ref Range    Glucose 96 65 - 99 mg/dL    BUN 13 6 - 20 mg/dL    Creatinine 1.24 0.76 - 1.27 mg/dL    Sodium 140 136 - 145 mmol/L    Potassium 4.2 3.5 - 5.2 mmol/L    Chloride 102 98 - 107 mmol/L    CO2 28.0 22.0 - 29.0 mmol/L    Calcium 9.9 8.6 - 10.5 mg/dL    Total Protein 7.3 6.0 - 8.5 g/dL    Albumin 4.6 3.5 - 5.2 g/dL    ALT (SGPT) 37 1 - 41 U/L    AST (SGOT) 23 1 - 40 U/L    Alkaline Phosphatase 65 39 - 117 U/L    Total Bilirubin 1.7 (H) 0.0 - 1.2 mg/dL    Globulin 2.7 gm/dL    A/G Ratio 1.7 g/dL    BUN/Creatinine Ratio 10.5 7.0 - 25.0    Anion Gap 10.0 5.0 - 15.0 mmol/L    eGFR 78.2 >60.0 mL/min/1.73   CBC Auto Differential    Collection Time: 02/17/25  9:44 AM    Specimen: Blood   Result Value Ref Range    WBC 7.36 3.40 - 10.80 10*3/mm3    RBC 4.86 4.14 - 5.80 " 10*6/mm3    Hemoglobin 14.9 13.0 - 17.7 g/dL    Hematocrit 43.6 37.5 - 51.0 %    MCV 89.7 79.0 - 97.0 fL    MCH 30.7 26.6 - 33.0 pg    MCHC 34.2 31.5 - 35.7 g/dL    RDW 12.3 12.3 - 15.4 %    RDW-SD 40.1 37.0 - 54.0 fl    MPV 9.4 6.0 - 12.0 fL    Platelets 225 140 - 450 10*3/mm3    Neutrophil % 67.7 42.7 - 76.0 %    Lymphocyte % 21.5 19.6 - 45.3 %    Monocyte % 7.9 5.0 - 12.0 %    Eosinophil % 1.8 0.3 - 6.2 %    Basophil % 0.7 0.0 - 1.5 %    Immature Grans % 0.4 0.0 - 0.5 %    Neutrophils, Absolute 4.99 1.70 - 7.00 10*3/mm3    Lymphocytes, Absolute 1.58 0.70 - 3.10 10*3/mm3    Monocytes, Absolute 0.58 0.10 - 0.90 10*3/mm3    Eosinophils, Absolute 0.13 0.00 - 0.40 10*3/mm3    Basophils, Absolute 0.05 0.00 - 0.20 10*3/mm3    Immature Grans, Absolute 0.03 0.00 - 0.05 10*3/mm3    nRBC 0.0 0.0 - 0.2 /100 WBC            Assessment and Plan     Diagnoses and all orders for this visit:    1. Anxiety and depression (Primary)  Assessment & Plan:  Patient has longstanding pattern of both anxiety and depression. Patient has a pattern of doing well with initial medication changes, however a few months after being on particular regimens the efficacy and will quickly wane. Approximately 9 months ago patient required hospitalization for 4 days at [facility] for suicidal ideation. At that time all of his psychiatric medicines were discontinued. Upon return to my office patient was started on Wellbutrin  mg daily as well as Vraylar 1.5 mg daily. Patient never followed up as outpatient with behavioral health. Patient states today that over the last month he has been experiencing severe depressive symptoms. He states he finds no linda or happiness in anything at all, would prefer to just lay in bed all day. He denies any recurrence of SI or HI.   - Long discussion with patient and his wife regarding the importance of establishing care and keeping consistency with behavioral health.  He will make follow-up visit with  both Paul and Erika prior to leaving the office today.  For now we will continue his bupropion XL at 300 mg daily dosing as well as the Vraylar at 3 mg daily dosing.  For anxiety we will provide hydroxyzine 25 milligrams 3 times daily as needed.    Orders:  -     Ambulatory Referral to Behavioral Health    2. Persistent depressive disorder    3. Generalized anxiety disorder    Other orders  -     hydrOXYzine (ATARAX) 25 MG tablet; Take 1 tablet by mouth 3 (Three) Times a Day As Needed for Itching.  Dispense: 30 tablet; Refill: 1           Follow Up  Return in about 4 weeks (around 8/22/2025) for Annual physical.    Aylin Monique, APRN

## 2025-07-29 ENCOUNTER — OFFICE VISIT (OUTPATIENT)
Age: 35
End: 2025-07-29
Payer: COMMERCIAL

## 2025-07-29 VITALS
SYSTOLIC BLOOD PRESSURE: 124 MMHG | HEIGHT: 73 IN | OXYGEN SATURATION: 98 % | DIASTOLIC BLOOD PRESSURE: 82 MMHG | BODY MASS INDEX: 29.9 KG/M2 | HEART RATE: 82 BPM | WEIGHT: 225.6 LBS

## 2025-07-29 DIAGNOSIS — F41.1 GENERALIZED ANXIETY DISORDER: ICD-10-CM

## 2025-07-29 DIAGNOSIS — F33.1 MODERATE EPISODE OF RECURRENT MAJOR DEPRESSIVE DISORDER: Primary | ICD-10-CM

## 2025-07-29 PROCEDURE — 99214 OFFICE O/P EST MOD 30 MIN: CPT

## 2025-07-29 NOTE — PROGRESS NOTES
Office Follow Up Visit      Patient Name: Hammad Pressley  : 1990   MRN: [id number]     Referring Provider: Aylin Monique APRN    Chief Complaint:  Psychiatric evaluation related to:    ICD-10-CM ICD-9-CM   1. Moderate episode of recurrent major depressive disorder  F33.1 296.32   2. Generalized anxiety disorder  F41.1 300.02        History of Present Illness:   Hammad Pressley is a 35 y.o. male who is here today for follow up appointment. I originally saw him 2 months ago for depression. At that time he had recently had the dosage on his medications increased by his primary care: Bupropion XL to 300 mg and Vraylar to 3 mg. We had planned to give the dosage adjustments more time and then follow up in 1 month. The patient failed to follow up, and is now following up for the first time since that visit.     He says that since that time he has had a lot of stuff going on personally in his life. He says that he had to quit his job due to not being able to handle working nights. He is also currently in the process of finding new housing. He is currently looking for new employment and housing, which is causing him increased stress.     He recently went to the ER for chest pain. He was worked up there for any cardiac issue and was eventually cleared. He then visited his primary care who likely told him this was anxiety-induced. At that time she has started him on hydroxyzine 25 mg as needed for his anxiety.     Today he says that the dosage increases on his depression medications were not helpful. He feels like they produced no change. He does feel like these medications were helpful when he first started them, but does not feel like they are very helpful currently. He also does not believe that the hydroxyzine has been very effective for his anxiety. Currently he is saying that his biggest complaint is his depression, as opposed to the anxiety.     He is scheduled to start therapy next month.  "Denies any suicidal ideation.     Previous medication failure of Lexapro.      Subjective     Patient History:   The following portions of the patient's history were reviewed and updated as appropriate: allergies, current medications, past family history, past medical history, past social history, past surgical history and problem list.     Medications:     Current Outpatient Medications:     buPROPion XL (Wellbutrin XL) 300 MG 24 hr tablet, Take 1 tablet by mouth Daily., Disp: 90 tablet, Rfl: 2    Cariprazine HCl (Vraylar) 3 MG capsule capsule, Take 1 capsule by mouth Daily., Disp: 90 capsule, Rfl: 1    glycopyrrolate (ROBINUL) 1 MG tablet, Take 1 tablet by mouth 2 (Two) Times a Day., Disp: 180 tablet, Rfl: 1    hydrOXYzine (ATARAX) 25 MG tablet, Take 1 tablet by mouth 3 (Three) Times a Day As Needed for Itching., Disp: 30 tablet, Rfl: 1    FLUoxetine (PROzac) 20 MG capsule, Take 1 capsule by mouth Daily., Disp: 30 capsule, Rfl: 1    Objective     Physical Exam:  Vital Signs:   Vitals:    07/29/25 1049   BP: 124/82   Pulse: 82   SpO2: 98%   Weight: 102 kg (225 lb 9.6 oz)   Height: 185.4 cm (73\")     Body mass index is 29.76 kg/m².     Mental Status Exam:   Hygiene:   good  Cooperation:  Cooperative  Eye Contact:  Good  Psychomotor Behavior:  Appropriate  Affect:  Appropriate  Mood: depressed  Speech:  Monotone  Thought Process:  Goal directed and Linear  Thought Content:  Normal  Suicidal:  None  Homicidal:  None  Hallucinations:  None  Delusion:  None  Memory:  Intact  Orientation:  Person, Place, Time, and Situation  Reliability:  good  Insight:  Good  Judgement:  Good  Impulse Control:  Good  Physical/Medical Issues:  No      Assessment / Plan      Visit Diagnosis/Orders Placed This Visit:  Diagnoses and all orders for this visit:    1. Moderate episode of recurrent major depressive disorder (Primary)  -     FLUoxetine (PROzac) 20 MG capsule; Take 1 capsule by mouth Daily.  Dispense: 30 capsule; Refill: 1  -     " Cariprazine HCl (Vraylar) 3 MG capsule capsule; Take 1 capsule by mouth Daily.  Dispense: 90 capsule; Refill: 1    2. Generalized anxiety disorder           Plan:   Safety: No acute safety concerns  Risk Assessment: Risk of self-harm acutely is low. Risk of self-harm chronically is also low, but could be further elevated in the event of treatment noncompliance.    His current medication regimen does not include any serotonergic agents, so we will start there as this likely could be the problem. He did have a previous failure of Lexapro, but says he just did not like the way this medication made him feel.     I will trial him on Prozac 20 mg to hopefully produce a response in his depression. No current concerns for bipolar, but I warned him to stop taking the medication if he felt like the medication made him feel more activated.     For now continue bupropion  mg and Vraylar 3 mg for depression. If the Prozac is successful I will likely look into removing one or both these medications.     For now continue hydroxyzine 25 mg as needed to give this more time to be successful for his anxiety, as he only started this medication last week.     Encouraged him to start therapy next month.     I will follow up with the patient in 1 month to assess how he is doing.    Continue supportive psychotherapy efforts and medications as indicated. Treatment and medication options discussed during today's visit. Patient ackowledged and verbally consented to continue with current treatment plan and was educated on the importance of compliance with treatment and follow-up appointments. Patient seems reasonably able to adhere to treatment plan.      Discussed medication options and treatment plan of prescribed medication as well as the risks, benefits, and potential side effects. Patient is agreeable to call the office with any worsening of symptoms or onset of side effects. Patient is agreeable to call 911 or go to the nearest ER  should they begin having SI/HI.    Quality Measures:   Never smoker      Follow Up:   Return in about 4 weeks (around 8/26/2025).      ROSETTA Hernandez

## 2025-07-31 PROBLEM — F34.1 PERSISTENT DEPRESSIVE DISORDER: Status: ACTIVE | Noted: 2025-05-22

## 2025-07-31 NOTE — ASSESSMENT & PLAN NOTE
Patient has longstanding pattern of both anxiety and depression. Patient has a pattern of doing well with initial medication changes, however a few months after being on particular regimens the efficacy and will quickly wane. Approximately 9 months ago patient required hospitalization for 4 days at [facility] for suicidal ideation. At that time all of his psychiatric medicines were discontinued. Upon return to my office patient was started on Wellbutrin  mg daily as well as Vraylar 1.5 mg daily. Patient never followed up as outpatient with behavioral health. Patient states today that over the last month he has been experiencing severe depressive symptoms. He states he finds no ilnda or happiness in anything at all, would prefer to just lay in bed all day. He denies any recurrence of SI or HI.   - Long discussion with patient and his wife regarding the importance of establishing care and keeping consistency with behavioral health.  He will make follow-up visit with both Paul and Erika prior to leaving the office today.  For now we will continue his bupropion XL at 300 mg daily dosing as well as the Vraylar at 3 mg daily dosing.  For anxiety we will provide hydroxyzine 25 milligrams 3 times daily as needed.

## 2025-08-18 ENCOUNTER — OFFICE VISIT (OUTPATIENT)
Age: 35
End: 2025-08-18
Payer: COMMERCIAL

## 2025-08-18 DIAGNOSIS — F41.1 GENERALIZED ANXIETY DISORDER: ICD-10-CM

## 2025-08-18 DIAGNOSIS — F34.1 PERSISTENT DEPRESSIVE DISORDER: Primary | ICD-10-CM

## 2025-08-18 PROCEDURE — 90791 PSYCH DIAGNOSTIC EVALUATION: CPT | Performed by: SOCIAL WORKER

## 2025-08-22 ENCOUNTER — OFFICE VISIT (OUTPATIENT)
Dept: FAMILY MEDICINE CLINIC | Facility: CLINIC | Age: 35
End: 2025-08-22
Payer: COMMERCIAL

## 2025-08-22 VITALS
WEIGHT: 216 LBS | RESPIRATION RATE: 16 BRPM | OXYGEN SATURATION: 98 % | HEART RATE: 81 BPM | DIASTOLIC BLOOD PRESSURE: 70 MMHG | TEMPERATURE: 97 F | SYSTOLIC BLOOD PRESSURE: 118 MMHG | BODY MASS INDEX: 28.63 KG/M2 | HEIGHT: 73 IN

## 2025-08-22 DIAGNOSIS — F33.1 MODERATE EPISODE OF RECURRENT MAJOR DEPRESSIVE DISORDER: ICD-10-CM

## 2025-08-22 DIAGNOSIS — F32.A ANXIETY AND DEPRESSION: ICD-10-CM

## 2025-08-22 DIAGNOSIS — Z00.00 ANNUAL PHYSICAL EXAM: Primary | ICD-10-CM

## 2025-08-22 DIAGNOSIS — F41.9 ANXIETY AND DEPRESSION: ICD-10-CM

## 2025-08-22 DIAGNOSIS — L74.510 HYPERHIDROSIS OF AXILLA: ICD-10-CM

## 2025-08-22 DIAGNOSIS — E55.9 VITAMIN D DEFICIENCY: ICD-10-CM

## 2025-08-22 DIAGNOSIS — F41.1 GENERALIZED ANXIETY DISORDER: ICD-10-CM

## 2025-08-22 DIAGNOSIS — Z98.890 S/P DISCECTOMY: ICD-10-CM

## 2025-08-22 DIAGNOSIS — L20.82 FLEXURAL ECZEMA: ICD-10-CM

## 2025-08-22 RX ORDER — BUPROPION HYDROCHLORIDE 300 MG/1
300 TABLET ORAL DAILY
Qty: 90 TABLET | Refills: 2 | Status: SHIPPED | OUTPATIENT
Start: 2025-08-22

## 2025-08-23 LAB
25(OH)D3+25(OH)D2 SERPL-MCNC: 29.5 NG/ML (ref 30–100)
ALBUMIN SERPL-MCNC: 4.6 G/DL (ref 4.1–5.1)
ALP SERPL-CCNC: 74 IU/L (ref 44–121)
ALT SERPL-CCNC: 30 IU/L (ref 0–44)
AST SERPL-CCNC: 18 IU/L (ref 0–40)
BILIRUB SERPL-MCNC: 1.6 MG/DL (ref 0–1.2)
BUN SERPL-MCNC: 11 MG/DL (ref 6–20)
BUN/CREAT SERPL: 9 (ref 9–20)
CALCIUM SERPL-MCNC: 9.9 MG/DL (ref 8.7–10.2)
CHLORIDE SERPL-SCNC: 104 MMOL/L (ref 96–106)
CHOLEST SERPL-MCNC: 160 MG/DL (ref 100–199)
CO2 SERPL-SCNC: 22 MMOL/L (ref 20–29)
CREAT SERPL-MCNC: 1.27 MG/DL (ref 0.76–1.27)
EGFRCR SERPLBLD CKD-EPI 2021: 76 ML/MIN/1.73
ERYTHROCYTE [DISTWIDTH] IN BLOOD BY AUTOMATED COUNT: 12.3 % (ref 11.6–15.4)
GLOBULIN SER CALC-MCNC: 2.8 G/DL (ref 1.5–4.5)
GLUCOSE SERPL-MCNC: 88 MG/DL (ref 70–99)
HCT VFR BLD AUTO: 44.2 % (ref 37.5–51)
HDLC SERPL-MCNC: 38 MG/DL
HGB BLD-MCNC: 14.5 G/DL (ref 13–17.7)
LDLC SERPL CALC-MCNC: 99 MG/DL (ref 0–99)
MCH RBC QN AUTO: 30.9 PG (ref 26.6–33)
MCHC RBC AUTO-ENTMCNC: 32.8 G/DL (ref 31.5–35.7)
MCV RBC AUTO: 94 FL (ref 79–97)
PLATELET # BLD AUTO: 235 X10E3/UL (ref 150–450)
POTASSIUM SERPL-SCNC: 4 MMOL/L (ref 3.5–5.2)
PROT SERPL-MCNC: 7.4 G/DL (ref 6–8.5)
RBC # BLD AUTO: 4.7 X10E6/UL (ref 4.14–5.8)
SODIUM SERPL-SCNC: 142 MMOL/L (ref 134–144)
T4 FREE SERPL-MCNC: 1.25 NG/DL (ref 0.82–1.77)
TRIGL SERPL-MCNC: 130 MG/DL (ref 0–149)
TSH SERPL DL<=0.005 MIU/L-ACNC: 1.08 UIU/ML (ref 0.45–4.5)
VLDLC SERPL CALC-MCNC: 23 MG/DL (ref 5–40)
WBC # BLD AUTO: 5.7 X10E3/UL (ref 3.4–10.8)

## (undated) DEVICE — GLV SURG PREMIERPRO MIC LTX PF SZ6.5 BRN

## (undated) DEVICE — DRSNG WND BORDR/ADHS NONADHR/GZ LF 4X4IN STRL

## (undated) DEVICE — TOOL MR8-14MH30D MR8 14CM MATCH DMD 3MM: Brand: MIDAS REX MR8

## (undated) DEVICE — STRAP POSTN KN/BDY FM 5X72IN DISP

## (undated) DEVICE — PATIENT RETURN ELECTRODE, SINGLE-USE, CONTACT QUALITY MONITORING, ADULT, WITH 9FT CORD, FOR PATIENTS WEIGING OVER 33LBS. (15KG): Brand: MEGADYNE

## (undated) DEVICE — HDRST INTUB GENTLETOUCH SLOT 7IN RT

## (undated) DEVICE — PK NEURO DISC 10

## (undated) DEVICE — GLV SURG PREMIERPRO MIC LTX PF SZ7 BRN

## (undated) DEVICE — CABL BIPOL MEGADYNE 12FT DISP

## (undated) DEVICE — SUT VIC PLS CTD ANTIB BR 3/0 8/18IN 45CM

## (undated) DEVICE — GLV SURG PREMIERPRO MIC LTX PF SZ7.5 BRN

## (undated) DEVICE — ANTIBACTERIAL UNDYED BRAIDED (POLYGLACTIN 910), SYNTHETIC ABSORBABLE SUTURE: Brand: COATED VICRYL

## (undated) DEVICE — MICRO HVTSA, 0.5G AND HVTSA SOURCEMARK PRODUCT CODE M1206 AND M1206-01: Brand: EXOFIN MICRO HVTSA, 0.5G

## (undated) DEVICE — CONN TBG Y 5 IN 1 LF STRL

## (undated) DEVICE — BLANKT WARM UPPR/BDY ARM/OUT 57X196CM

## (undated) DEVICE — CORD,CAUTERY,BIPOLAR,STERILE: Brand: MEDLINE

## (undated) DEVICE — C-ARM DRAPE: Brand: DEROYAL